# Patient Record
Sex: FEMALE | Race: WHITE | Employment: FULL TIME | URBAN - METROPOLITAN AREA
[De-identification: names, ages, dates, MRNs, and addresses within clinical notes are randomized per-mention and may not be internally consistent; named-entity substitution may affect disease eponyms.]

---

## 2019-01-11 ENCOUNTER — OFFICE VISIT (OUTPATIENT)
Dept: PRIMARY CARE CLINIC | Age: 64
End: 2019-01-11
Payer: COMMERCIAL

## 2019-01-11 VITALS
OXYGEN SATURATION: 98 % | HEIGHT: 68 IN | BODY MASS INDEX: 20.76 KG/M2 | SYSTOLIC BLOOD PRESSURE: 122 MMHG | HEART RATE: 74 BPM | DIASTOLIC BLOOD PRESSURE: 80 MMHG | TEMPERATURE: 97.8 F | WEIGHT: 137 LBS

## 2019-01-11 DIAGNOSIS — N39.0 FREQUENT UTI: Primary | ICD-10-CM

## 2019-01-11 DIAGNOSIS — H83.09 LABYRINTHITIS, UNSPECIFIED LATERALITY: ICD-10-CM

## 2019-01-11 DIAGNOSIS — E78.00 HYPERCHOLESTEROLEMIA: ICD-10-CM

## 2019-01-11 DIAGNOSIS — D69.6 THROMBOCYTOPENIA (HCC): Chronic | ICD-10-CM

## 2019-01-11 PROCEDURE — 99203 OFFICE O/P NEW LOW 30 MIN: CPT | Performed by: INTERNAL MEDICINE

## 2019-01-11 RX ORDER — MECLIZINE HCL 12.5 MG/1
TABLET ORAL
COMMUNITY
Start: 2018-04-19 | End: 2019-01-11 | Stop reason: SDUPTHER

## 2019-01-11 RX ORDER — TOBRAMYCIN AND DEXAMETHASONE 3; 1 MG/ML; MG/ML
SUSPENSION/ DROPS OPHTHALMIC
Refills: 0 | COMMUNITY
Start: 2018-12-28

## 2019-01-11 RX ORDER — SULFAMETHOXAZOLE AND TRIMETHOPRIM 800; 160 MG/1; MG/1
TABLET ORAL
Qty: 30 TABLET | Refills: 0 | Status: SHIPPED
Start: 2019-01-11 | End: 2021-04-01 | Stop reason: SDUPTHER

## 2019-01-11 RX ORDER — MECLIZINE HCL 12.5 MG/1
TABLET ORAL
Qty: 30 TABLET | Refills: 0 | Status: SHIPPED
Start: 2019-01-11 | End: 2021-04-01 | Stop reason: SDUPTHER

## 2019-01-11 RX ORDER — SULFAMETHOXAZOLE AND TRIMETHOPRIM 800; 160 MG/1; MG/1
TABLET ORAL
COMMUNITY
Start: 2018-04-19 | End: 2019-01-11 | Stop reason: SDUPTHER

## 2019-01-11 ASSESSMENT — PATIENT HEALTH QUESTIONNAIRE - PHQ9
SUM OF ALL RESPONSES TO PHQ QUESTIONS 1-9: 0
SUM OF ALL RESPONSES TO PHQ QUESTIONS 1-9: 0
2. FEELING DOWN, DEPRESSED OR HOPELESS: 0
1. LITTLE INTEREST OR PLEASURE IN DOING THINGS: 0
SUM OF ALL RESPONSES TO PHQ9 QUESTIONS 1 & 2: 0

## 2019-01-14 ENCOUNTER — NURSE ONLY (OUTPATIENT)
Dept: PRIMARY CARE CLINIC | Age: 64
End: 2019-01-14

## 2019-01-14 ENCOUNTER — HOSPITAL ENCOUNTER (OUTPATIENT)
Age: 64
Discharge: HOME | End: 2019-01-16
Payer: COMMERCIAL

## 2019-01-14 VITALS — HEIGHT: 68 IN | BODY MASS INDEX: 20.99 KG/M2 | WEIGHT: 138.5 LBS

## 2019-01-14 DIAGNOSIS — E78.2 MIXED HYPERLIPIDEMIA: Primary | ICD-10-CM

## 2019-01-14 DIAGNOSIS — D69.6 THROMBOCYTOPENIA (HCC): Chronic | ICD-10-CM

## 2019-01-14 DIAGNOSIS — E78.00 HYPERCHOLESTEROLEMIA: ICD-10-CM

## 2019-01-14 LAB
ALBUMIN SERPL-MCNC: 4.6 G/DL (ref 3.5–5.2)
ALP BLD-CCNC: 66 U/L (ref 35–104)
ALT SERPL-CCNC: 16 U/L (ref 0–32)
ANION GAP SERPL CALCULATED.3IONS-SCNC: 16 MMOL/L (ref 7–16)
AST SERPL-CCNC: 21 U/L (ref 0–31)
BILIRUB SERPL-MCNC: 0.2 MG/DL (ref 0–1.2)
BUN BLDV-MCNC: 15 MG/DL (ref 8–23)
CALCIUM SERPL-MCNC: 9.2 MG/DL (ref 8.6–10.2)
CHLORIDE BLD-SCNC: 102 MMOL/L (ref 98–107)
CHOLESTEROL, TOTAL: 190 MG/DL (ref 0–199)
CO2: 24 MMOL/L (ref 22–29)
CREAT SERPL-MCNC: 1 MG/DL (ref 0.5–1)
GFR AFRICAN AMERICAN: >60
GFR NON-AFRICAN AMERICAN: 56 ML/MIN/1.73
GLUCOSE BLD-MCNC: 83 MG/DL (ref 74–99)
HCT VFR BLD CALC: 41.8 % (ref 34–48)
HDLC SERPL-MCNC: 56 MG/DL
HEMOGLOBIN: 13.5 G/DL (ref 11.5–15.5)
LDL CHOLESTEROL CALCULATED: 104 MG/DL (ref 0–99)
MCH RBC QN AUTO: 30.6 PG (ref 26–35)
MCHC RBC AUTO-ENTMCNC: 32.3 % (ref 32–34.5)
MCV RBC AUTO: 94.8 FL (ref 80–99.9)
PDW BLD-RTO: 12.1 FL (ref 11.5–15)
PLATELET # BLD: 109 E9/L (ref 130–450)
PMV BLD AUTO: 10.2 FL (ref 7–12)
POTASSIUM SERPL-SCNC: 4.3 MMOL/L (ref 3.5–5)
RBC # BLD: 4.41 E12/L (ref 3.5–5.5)
SODIUM BLD-SCNC: 142 MMOL/L (ref 132–146)
TOTAL PROTEIN: 7 G/DL (ref 6.4–8.3)
TRIGL SERPL-MCNC: 152 MG/DL (ref 0–149)
VLDLC SERPL CALC-MCNC: 30 MG/DL
WBC # BLD: 4.8 E9/L (ref 4.5–11.5)

## 2019-01-14 PROCEDURE — 85027 COMPLETE CBC AUTOMATED: CPT

## 2019-01-14 PROCEDURE — 80053 COMPREHEN METABOLIC PANEL: CPT

## 2019-01-14 PROCEDURE — 80061 LIPID PANEL: CPT

## 2019-07-03 ENCOUNTER — OFFICE VISIT (OUTPATIENT)
Dept: PRIMARY CARE CLINIC | Age: 64
End: 2019-07-03
Payer: COMMERCIAL

## 2019-07-03 VITALS
HEART RATE: 74 BPM | DIASTOLIC BLOOD PRESSURE: 80 MMHG | OXYGEN SATURATION: 98 % | BODY MASS INDEX: 20.83 KG/M2 | TEMPERATURE: 97.8 F | SYSTOLIC BLOOD PRESSURE: 138 MMHG | WEIGHT: 137 LBS

## 2019-07-03 DIAGNOSIS — L08.9 SKIN INFECTION: Primary | ICD-10-CM

## 2019-07-03 PROCEDURE — 99213 OFFICE O/P EST LOW 20 MIN: CPT | Performed by: INTERNAL MEDICINE

## 2019-10-09 ENCOUNTER — OFFICE VISIT (OUTPATIENT)
Dept: PRIMARY CARE CLINIC | Age: 64
End: 2019-10-09
Payer: MEDICARE

## 2019-10-09 VITALS
BODY MASS INDEX: 21.13 KG/M2 | SYSTOLIC BLOOD PRESSURE: 122 MMHG | OXYGEN SATURATION: 99 % | HEART RATE: 74 BPM | TEMPERATURE: 96 F | DIASTOLIC BLOOD PRESSURE: 68 MMHG | WEIGHT: 139 LBS

## 2019-10-09 DIAGNOSIS — J40 BRONCHITIS: Primary | ICD-10-CM

## 2019-10-09 PROCEDURE — 1036F TOBACCO NON-USER: CPT | Performed by: INTERNAL MEDICINE

## 2019-10-09 PROCEDURE — 99213 OFFICE O/P EST LOW 20 MIN: CPT | Performed by: INTERNAL MEDICINE

## 2019-10-09 PROCEDURE — 3014F SCREEN MAMMO DOC REV: CPT | Performed by: INTERNAL MEDICINE

## 2019-10-09 PROCEDURE — G8427 DOCREV CUR MEDS BY ELIG CLIN: HCPCS | Performed by: INTERNAL MEDICINE

## 2019-10-09 PROCEDURE — 4040F PNEUMOC VAC/ADMIN/RCVD: CPT | Performed by: INTERNAL MEDICINE

## 2019-10-09 PROCEDURE — 3017F COLORECTAL CA SCREEN DOC REV: CPT | Performed by: INTERNAL MEDICINE

## 2019-10-09 PROCEDURE — 1123F ACP DISCUSS/DSCN MKR DOCD: CPT | Performed by: INTERNAL MEDICINE

## 2019-10-09 PROCEDURE — 1090F PRES/ABSN URINE INCON ASSESS: CPT | Performed by: INTERNAL MEDICINE

## 2019-10-09 PROCEDURE — G8400 PT W/DXA NO RESULTS DOC: HCPCS | Performed by: INTERNAL MEDICINE

## 2019-10-09 PROCEDURE — G8484 FLU IMMUNIZE NO ADMIN: HCPCS | Performed by: INTERNAL MEDICINE

## 2019-10-09 PROCEDURE — G8420 CALC BMI NORM PARAMETERS: HCPCS | Performed by: INTERNAL MEDICINE

## 2019-10-09 RX ORDER — BENZONATATE 100 MG/1
100 CAPSULE ORAL 2 TIMES DAILY PRN
Qty: 20 CAPSULE | Refills: 0 | Status: SHIPPED | OUTPATIENT
Start: 2019-10-09 | End: 2019-10-16

## 2019-10-09 RX ORDER — AZITHROMYCIN 250 MG/1
250 TABLET, FILM COATED ORAL SEE ADMIN INSTRUCTIONS
Qty: 6 TABLET | Refills: 0 | Status: SHIPPED | OUTPATIENT
Start: 2019-10-09 | End: 2019-10-14

## 2019-10-09 RX ORDER — TOLTERODINE 4 MG/1
CAPSULE, EXTENDED RELEASE ORAL
Refills: 0 | COMMUNITY
Start: 2019-09-26

## 2020-06-11 ENCOUNTER — VIRTUAL VISIT (OUTPATIENT)
Dept: PRIMARY CARE CLINIC | Age: 65
End: 2020-06-11
Payer: MEDICARE

## 2020-06-11 PROCEDURE — 3017F COLORECTAL CA SCREEN DOC REV: CPT | Performed by: INTERNAL MEDICINE

## 2020-06-11 PROCEDURE — G8427 DOCREV CUR MEDS BY ELIG CLIN: HCPCS | Performed by: INTERNAL MEDICINE

## 2020-06-11 PROCEDURE — 4040F PNEUMOC VAC/ADMIN/RCVD: CPT | Performed by: INTERNAL MEDICINE

## 2020-06-11 PROCEDURE — G8420 CALC BMI NORM PARAMETERS: HCPCS | Performed by: INTERNAL MEDICINE

## 2020-06-11 PROCEDURE — 1090F PRES/ABSN URINE INCON ASSESS: CPT | Performed by: INTERNAL MEDICINE

## 2020-06-11 PROCEDURE — G8400 PT W/DXA NO RESULTS DOC: HCPCS | Performed by: INTERNAL MEDICINE

## 2020-06-11 PROCEDURE — 1036F TOBACCO NON-USER: CPT | Performed by: INTERNAL MEDICINE

## 2020-06-11 PROCEDURE — 99213 OFFICE O/P EST LOW 20 MIN: CPT | Performed by: INTERNAL MEDICINE

## 2020-06-11 PROCEDURE — 1123F ACP DISCUSS/DSCN MKR DOCD: CPT | Performed by: INTERNAL MEDICINE

## 2020-06-11 RX ORDER — FLUTICASONE FUROATE 100 UG/1
1 POWDER RESPIRATORY (INHALATION) DAILY
Qty: 30 EACH | Refills: 0 | Status: SHIPPED
Start: 2020-06-11 | End: 2021-04-01

## 2020-06-11 ASSESSMENT — PATIENT HEALTH QUESTIONNAIRE - PHQ9
SUM OF ALL RESPONSES TO PHQ9 QUESTIONS 1 & 2: 0
1. LITTLE INTEREST OR PLEASURE IN DOING THINGS: 0
SUM OF ALL RESPONSES TO PHQ QUESTIONS 1-9: 0
SUM OF ALL RESPONSES TO PHQ QUESTIONS 1-9: 0
2. FEELING DOWN, DEPRESSED OR HOPELESS: 0

## 2020-06-11 NOTE — PROGRESS NOTES
for activity change, appetite change, chills, fatigue and fever. Respiratory: chronic cough   Cardiovascular: Negative for chest pain, palpitations and leg swelling. Gastrointestinal: Negative for abdominal distention, constipation, diarrhea, nausea and vomiting. Musculoskeletal: Negative for arthralgias, back pain, gait problem and joint swelling. Neurological: Negative for dizziness, weakness,numbness and headaches. There were no vitals taken for this visit. Physical Exam   Constitutional:  Oriented to person, place, and time. Appears well-developed and well-nourished. No acute distress. Neurological:Alert and oriented to person, place, and time. Skin: No diaphoresis. Psychiatric: Normal mood and affect. Behavior is Normal.     ASSESSMENT AND PLAN:    Naeem Jean was seen today for cough. Diagnoses and all orders for this visit:    Chronic cough  -     beclomethasone (QVAR) 80 MCG/ACT inhaler; Inhale 1 puff into the lungs 2 times daily  -     XR CHEST STANDARD (2 VW); Future        Thalia Blaine presents today for evaluation of a persistent cough since March. Her symptoms started with a URI illness. She denies any fevers chills weight loss or productivity of the cough. I suspect that she likely has post infectious bronchospasm. I will treat her with Qvar. I will also perform radiography of the chest    TeleMedicine Patient Consent    This visit was performed as a virtual video visit using a synchronous, two-way, audio-video telehealth technology platform. Patient identification was verified at the start of the visit, including the patient's telephone number and physical location. I discussed with the patient the nature of our telehealth visits, that:     1. Due to the nature of an audio- video modality, the only components of a physical exam that could be done are the elements supported by direct observation.   2. I would evaluate the patient and recommend diagnostics and treatments based on

## 2020-06-12 ENCOUNTER — HOSPITAL ENCOUNTER (OUTPATIENT)
Dept: GENERAL RADIOLOGY | Age: 65
Discharge: HOME | End: 2020-06-14
Payer: MEDICARE

## 2020-06-12 ENCOUNTER — HOSPITAL ENCOUNTER (OUTPATIENT)
Age: 65
Discharge: HOME | End: 2020-06-14
Payer: MEDICARE

## 2020-06-12 PROCEDURE — 71046 X-RAY EXAM CHEST 2 VIEWS: CPT

## 2020-07-16 ENCOUNTER — VIRTUAL VISIT (OUTPATIENT)
Dept: PRIMARY CARE CLINIC | Age: 65
End: 2020-07-16
Payer: MEDICARE

## 2020-07-16 PROCEDURE — 99213 OFFICE O/P EST LOW 20 MIN: CPT | Performed by: INTERNAL MEDICINE

## 2020-07-16 PROCEDURE — 1090F PRES/ABSN URINE INCON ASSESS: CPT | Performed by: INTERNAL MEDICINE

## 2020-07-16 PROCEDURE — 1123F ACP DISCUSS/DSCN MKR DOCD: CPT | Performed by: INTERNAL MEDICINE

## 2020-07-16 PROCEDURE — G8427 DOCREV CUR MEDS BY ELIG CLIN: HCPCS | Performed by: INTERNAL MEDICINE

## 2020-07-16 PROCEDURE — 1036F TOBACCO NON-USER: CPT | Performed by: INTERNAL MEDICINE

## 2020-07-16 PROCEDURE — G8420 CALC BMI NORM PARAMETERS: HCPCS | Performed by: INTERNAL MEDICINE

## 2020-07-16 PROCEDURE — 3017F COLORECTAL CA SCREEN DOC REV: CPT | Performed by: INTERNAL MEDICINE

## 2020-07-16 PROCEDURE — 4040F PNEUMOC VAC/ADMIN/RCVD: CPT | Performed by: INTERNAL MEDICINE

## 2020-07-16 PROCEDURE — G8400 PT W/DXA NO RESULTS DOC: HCPCS | Performed by: INTERNAL MEDICINE

## 2020-07-16 RX ORDER — METHYLPREDNISOLONE 4 MG/1
TABLET ORAL
Qty: 1 KIT | Refills: 0 | Status: SHIPPED | OUTPATIENT
Start: 2020-07-16 | End: 2020-07-22

## 2020-07-16 RX ORDER — DOXYCYCLINE HYCLATE 100 MG/1
100 CAPSULE ORAL 2 TIMES DAILY
Qty: 20 CAPSULE | Refills: 0 | Status: SHIPPED | OUTPATIENT
Start: 2020-07-16 | End: 2020-07-26

## 2020-07-16 NOTE — PROGRESS NOTES
7/16/20    David Caldwell, a female of 71 y.o. came to the office     David Caldwell presents today for evaluation of headache and nausea. This has been going on for 2 days. She thought she had food poisoning. She had diarrhea. She woke up Tuesday with a bad headache. The pain is over the left eye. She was treated recently with Qvar for a chronic cough. Her sinuses have been bothering her. She has been taking sinus pill that has been helping. She did get a rash as well. She has not had any vision changes. She denies any vision changes. She did not have any falls or injuries to the head. She does have some sinus congestion but denies any fevers or chills. She did have one episode yesterday of vomiting but felt that this was likely due to her pain    Patient Active Problem List   Diagnosis    Endometrial polyp    Thrombocytopenia (HCC)      Allergies   Allergen Reactions    Penicillins Rash    Aspirin Other (See Comments)     Ears ring     Current Outpatient Medications on File Prior to Visit   Medication Sig Dispense Refill    beclomethasone (QVAR) 80 MCG/ACT inhaler Inhale 1 puff into the lungs 2 times daily 1 Inhaler 3    fluticasone (ARNUITY ELLIPTA) 100 MCG/ACT AEPB Inhale 1 puff into the lungs daily 30 each 0    tolterodine (DETROL LA) 4 MG extended release capsule take 1 capsule by mouth daily if needed  0    tobramycin-dexamethasone (TOBRADEX) 0.3-0.1 % ophthalmic suspension apply 2 drops INTO AFFECTED EYE twice a day for 7 days  0    sulfamethoxazole-trimethoprim (BACTRIM DS;SEPTRA DS) 800-160 MG per tablet take 1 tablet by mouth AFTER INTERCOURSE TO PREVENT INFECTION 30 tablet 0    meclizine (ANTIVERT) 12.5 MG tablet take 1 tablet by mouth three times a day 30 tablet 0    ibuprofen (IBU) 600 MG tablet Take 1 tablet by mouth every 6 hours as needed for Pain. 20 tablet 0    Multiple Vitamin (MULTIVITAMIN PO) Take  by mouth daily.         Omega-3 Fatty Acids (OMEGA 3 PO) Take 2 tablets by mouth daily.        Misc Natural Products (OSTEO BI-FLEX TRIPLE STRENGTH PO) Take 2 tablets by mouth daily.  Calcium Carbonate-Vitamin D (CALCIUM 600/VITAMIN D PO) Take  by mouth. 800 mg vit d       GREEN TEA 2 drops by Does not apply route daily. No current facility-administered medications on file prior to visit. Review of Systems  Constitutional:Negative for activity change, appetite change, chills, fatigue and fever. Respiratory: Negative for choking, chest tightness, shortness of breath and wheezing. Cardiovascular: Negative for chest pain, palpitations and leg swelling. Gastrointestinal: Negative for abdominal distention, constipation, diarrhea, nausea and vomiting. Musculoskeletal: Negative for arthralgias, back pain, gait problem and joint swelling. Neurological: Negative for dizziness, weakness,numbness and headaches. There were no vitals taken for this visit. Physical Exam   Constitutional:  Oriented to person, place, and time. Appears well-developed and well-nourished. No acute distress. HENT: No sinus tenderness or lymphadenopathy  Head: Normocephalic and atraumatic. Eyes: Eyes exhibits no discharge. No scleral icterus present. Neck: No tracheal deviation present. No thyromegaly present. Cardiovascular: Normal rate, regular rhythm, normal heart sounds and intact distal pulses. Exam reveals no gallop nor friction rub. No murmur heard. Pulmonary: Effort normal and breath sounds normal. No respiratory distress. No wheezes or rales. Musculoskeletal:  No tenderness to palpation  Neurological:Alert and oriented to person, place, and time. Skin: No diaphoresis. Psychiatric: Normal mood and affect. Behavior is Normal.     ASSESSMENT AND PLAN:    Ro Casas was seen today for headache and nausea. Diagnoses and all orders for this visit:    Sinus congestion  -     doxycycline hyclate (VIBRAMYCIN) 100 MG capsule;  Take 1 capsule by mouth 2 times daily for 10 days  -     methylPREDNISolone (MEDROL DOSEPACK) 4 MG tablet; Take by mouth. Madelyn Boast presents today for dilation of severe left-sided headache accompanied with nausea. She states that when she uses Sudafed this alleviates her symptoms but unfortunately they returned. Today I will prescribe her Medrol and doxycycline due to her penicillin allergy. She does state that this is the most severe headache she has had in quite some time. If her symptoms fail to improve or worsen she may benefit from CT scan of the head. She denies any neurological signs or symptoms today other than headache. Please note that the above documentation was prepared using voice recognition software. Every attempt was made to ensure accuracy but there may be spelling, grammatical, and contextual errors. Electronically signed by Adrianne Murray DO on 7/16/2020 at 1:51 PM        No follow-ups on file.     Adrianne Murray DO

## 2020-09-22 ENCOUNTER — HOSPITAL ENCOUNTER (OUTPATIENT)
Age: 65
Discharge: HOME | End: 2020-09-24
Payer: MEDICARE

## 2020-09-22 PROCEDURE — G0123 SCREEN CERV/VAG THIN LAYER: HCPCS

## 2021-04-01 ENCOUNTER — OFFICE VISIT (OUTPATIENT)
Dept: PRIMARY CARE CLINIC | Age: 66
End: 2021-04-01
Payer: MEDICARE

## 2021-04-01 VITALS
WEIGHT: 135 LBS | OXYGEN SATURATION: 96 % | HEIGHT: 68 IN | HEART RATE: 75 BPM | TEMPERATURE: 97.5 F | SYSTOLIC BLOOD PRESSURE: 128 MMHG | DIASTOLIC BLOOD PRESSURE: 80 MMHG | BODY MASS INDEX: 20.46 KG/M2

## 2021-04-01 DIAGNOSIS — N39.0 FREQUENT UTI: ICD-10-CM

## 2021-04-01 DIAGNOSIS — H83.09 LABYRINTHITIS, UNSPECIFIED LATERALITY: ICD-10-CM

## 2021-04-01 DIAGNOSIS — R68.84 JAW PAIN, NON-TMJ: Primary | ICD-10-CM

## 2021-04-01 PROCEDURE — 4040F PNEUMOC VAC/ADMIN/RCVD: CPT | Performed by: INTERNAL MEDICINE

## 2021-04-01 PROCEDURE — G8427 DOCREV CUR MEDS BY ELIG CLIN: HCPCS | Performed by: INTERNAL MEDICINE

## 2021-04-01 PROCEDURE — 1123F ACP DISCUSS/DSCN MKR DOCD: CPT | Performed by: INTERNAL MEDICINE

## 2021-04-01 PROCEDURE — G8400 PT W/DXA NO RESULTS DOC: HCPCS | Performed by: INTERNAL MEDICINE

## 2021-04-01 PROCEDURE — 3017F COLORECTAL CA SCREEN DOC REV: CPT | Performed by: INTERNAL MEDICINE

## 2021-04-01 PROCEDURE — 1090F PRES/ABSN URINE INCON ASSESS: CPT | Performed by: INTERNAL MEDICINE

## 2021-04-01 PROCEDURE — 99213 OFFICE O/P EST LOW 20 MIN: CPT | Performed by: INTERNAL MEDICINE

## 2021-04-01 PROCEDURE — 1036F TOBACCO NON-USER: CPT | Performed by: INTERNAL MEDICINE

## 2021-04-01 PROCEDURE — G8420 CALC BMI NORM PARAMETERS: HCPCS | Performed by: INTERNAL MEDICINE

## 2021-04-01 RX ORDER — SULFAMETHOXAZOLE AND TRIMETHOPRIM 800; 160 MG/1; MG/1
TABLET ORAL
Qty: 30 TABLET | Refills: 0 | Status: SHIPPED
Start: 2021-04-01 | End: 2022-10-26 | Stop reason: SDUPTHER

## 2021-04-01 RX ORDER — MECLIZINE HCL 12.5 MG/1
TABLET ORAL
Qty: 30 TABLET | Refills: 0 | Status: SHIPPED
Start: 2021-04-01 | End: 2022-10-26 | Stop reason: SDUPTHER

## 2021-04-01 RX ORDER — NAPROXEN 500 MG/1
500 TABLET ORAL 2 TIMES DAILY WITH MEALS
Qty: 14 TABLET | Refills: 0 | Status: SHIPPED | OUTPATIENT
Start: 2021-04-01 | End: 2021-04-08

## 2021-04-01 ASSESSMENT — PATIENT HEALTH QUESTIONNAIRE - PHQ9
SUM OF ALL RESPONSES TO PHQ QUESTIONS 1-9: 0
SUM OF ALL RESPONSES TO PHQ QUESTIONS 1-9: 0
2. FEELING DOWN, DEPRESSED OR HOPELESS: 0

## 2021-04-01 NOTE — PROGRESS NOTES
4/1/21    Tiffany Beck, a female of 76 y.o. came to the office     Tiffany Beck presents today for evaluation of ear pain. She fell last Wednesday and struck her chin. Her left ear started to hurt afterwards. She followed up with her eye doctor and chiropractor. She tripped on the sidewalk at that time. She has some pain on the inside of the jaw. She denies any neck pain. Patient Active Problem List   Diagnosis    Endometrial polyp    Thrombocytopenia (HCC)      Allergies   Allergen Reactions    Penicillins Rash    Aspirin Other (See Comments)     Ears ring     Current Outpatient Medications on File Prior to Visit   Medication Sig Dispense Refill    fluticasone (VERAMYST) 27.5 MCG/SPRAY nasal spray 2 sprays by Each Nostril route daily      tolterodine (DETROL LA) 4 MG extended release capsule take 1 capsule by mouth daily if needed  0    tobramycin-dexamethasone (TOBRADEX) 0.3-0.1 % ophthalmic suspension apply 2 drops INTO AFFECTED EYE twice a day for 7 days  0    ibuprofen (IBU) 600 MG tablet Take 1 tablet by mouth every 6 hours as needed for Pain. 20 tablet 0    Multiple Vitamin (MULTIVITAMIN PO) Take  by mouth daily.  Omega-3 Fatty Acids (OMEGA 3 PO) Take 2 tablets by mouth daily.  Misc Natural Products (OSTEO BI-FLEX TRIPLE STRENGTH PO) Take 2 tablets by mouth daily.  Calcium Carbonate-Vitamin D (CALCIUM 600/VITAMIN D PO) Take  by mouth. 800 mg vit d        No current facility-administered medications on file prior to visit. Review of Systems  Constitutional:Negative for activity change, appetite change, chills, fatigue and fever. Respiratory: Negative for choking, chest tightness, shortness of breath and wheezing. Cardiovascular: Negative for chest pain, palpitations and leg swelling. Gastrointestinal: Negative for abdominal distention, constipation, diarrhea, nausea and vomiting.    Musculoskeletal: Negative for arthralgias, back pain, gait problem and joint swelling. Neurological: Negative for dizziness, weakness,numbness and headaches. /80   Pulse 75   Temp 97.5 °F (36.4 °C)   Ht 5' 8\" (1.727 m)   Wt 135 lb (61.2 kg)   SpO2 96%   BMI 20.53 kg/m²      Physical Exam   Constitutional:  Oriented to person, place, and time. Appears well-developed and well-nourished. No acute distress. HENT: No sinus tenderness or lymphadenopathy  Head: Normocephalic and atraumatic. Eyes: Eyes exhibits no discharge. No scleral icterus present. Neck: No tracheal deviation present. No thyromegaly present. Cardiovascular: Normal rate, regular rhythm, normal heart sounds and intact distal pulses. Exam reveals no gallop nor friction rub. No murmur heard. Pulmonary: Effort normal and breath sounds normal. No respiratory distress. No wheezes or rales. Abdomen: No signs of rigidity rebound or organomegaly  Musculoskeletal:  No tenderness to palpation  Neurological:Alert and oriented to person, place, and time. Skin: No diaphoresis. Psychiatric: Normal mood and affect. Behavior is Normal.     ASSESSMENT AND PLAN:    Curry Shirley was seen today for otalgia and head injury. Diagnoses and all orders for this visit:    Jaw pain, non-TMJ  -     naproxen (NAPROSYN) 500 MG tablet;  Take 1 tablet by mouth 2 times daily (with meals) for 14 doses    Frequent UTI  -     sulfamethoxazole-trimethoprim (BACTRIM DS;SEPTRA DS) 800-160 MG per tablet; take 1 tablet by mouth AFTER INTERCOURSE TO PREVENT INFECTION    Labyrinthitis, unspecified laterality  -     meclizine (ANTIVERT) 12.5 MG tablet; take 1 tablet by mouth three times a day        She presents today for left TMJ pain after a fall    Her external auditory canal is unremarkable    I will start her on Naprosyn    She should continue Bactrim for her frequent urinary tract infections and Antivert for her vertigo as needed    I have low clinical suspicion for concussion    Return if symptoms worsen or fail to improve.     Electronically signed by Enedina Rowley DO on 4/1/2021 at 11:35 AM    Enedina Rowley DO

## 2021-04-23 ENCOUNTER — OFFICE VISIT (OUTPATIENT)
Dept: FAMILY MEDICINE CLINIC | Age: 66
End: 2021-04-23
Payer: MEDICARE

## 2021-04-23 VITALS
TEMPERATURE: 97 F | RESPIRATION RATE: 18 BRPM | HEART RATE: 114 BPM | HEIGHT: 68 IN | WEIGHT: 132 LBS | DIASTOLIC BLOOD PRESSURE: 78 MMHG | SYSTOLIC BLOOD PRESSURE: 118 MMHG | BODY MASS INDEX: 20 KG/M2 | OXYGEN SATURATION: 98 %

## 2021-04-23 DIAGNOSIS — U07.1 COVID-19: Primary | ICD-10-CM

## 2021-04-23 LAB
Lab: ABNORMAL
PERFORMING INSTRUMENT: ABNORMAL
QC PASS/FAIL: ABNORMAL
SARS-COV-2, POC: DETECTED

## 2021-04-23 PROCEDURE — 4040F PNEUMOC VAC/ADMIN/RCVD: CPT | Performed by: NURSE PRACTITIONER

## 2021-04-23 PROCEDURE — G8400 PT W/DXA NO RESULTS DOC: HCPCS | Performed by: NURSE PRACTITIONER

## 2021-04-23 PROCEDURE — 3017F COLORECTAL CA SCREEN DOC REV: CPT | Performed by: NURSE PRACTITIONER

## 2021-04-23 PROCEDURE — G8420 CALC BMI NORM PARAMETERS: HCPCS | Performed by: NURSE PRACTITIONER

## 2021-04-23 PROCEDURE — 99213 OFFICE O/P EST LOW 20 MIN: CPT | Performed by: NURSE PRACTITIONER

## 2021-04-23 PROCEDURE — G8427 DOCREV CUR MEDS BY ELIG CLIN: HCPCS | Performed by: NURSE PRACTITIONER

## 2021-04-23 PROCEDURE — 87426 SARSCOV CORONAVIRUS AG IA: CPT | Performed by: NURSE PRACTITIONER

## 2021-04-23 PROCEDURE — 1090F PRES/ABSN URINE INCON ASSESS: CPT | Performed by: NURSE PRACTITIONER

## 2021-04-23 PROCEDURE — 1036F TOBACCO NON-USER: CPT | Performed by: NURSE PRACTITIONER

## 2021-04-23 PROCEDURE — 1123F ACP DISCUSS/DSCN MKR DOCD: CPT | Performed by: NURSE PRACTITIONER

## 2021-04-23 RX ORDER — DOXYCYCLINE HYCLATE 100 MG/1
100 CAPSULE ORAL 2 TIMES DAILY
Qty: 20 CAPSULE | Refills: 0 | Status: SHIPPED | OUTPATIENT
Start: 2021-04-23 | End: 2021-05-03

## 2021-04-23 RX ORDER — ZINC SULFATE 50(220)MG
50 CAPSULE ORAL DAILY
Qty: 7 CAPSULE | Refills: 0 | Status: SHIPPED | OUTPATIENT
Start: 2021-04-23 | End: 2022-10-26

## 2021-04-23 NOTE — PROGRESS NOTES
21  Sarah Miller : 1955 Sex: female  Age 76 y.o. Subjective:  Chief Complaint   Patient presents with    Generalized Body Aches     since wed (exposure to Covid / ) he was seen This past Monday.  Fatigue    Diarrhea       HPI:   Sarah Miller , 76 y.o. female presents to the clinic for evaluation of sinus congestion x 2 day. The patient also reports body aches, headache, fatigue, mild intermittent dry cough, and intermittent diarrhea. The patient has taken Tylenol for symptoms. The patient reports unchanged symptoms over time. The patient reports COVID-19 ( / father) ill exposure. The patient denies acute loss of taste and smell, headache, sore throat, rash, and fever. The patient also denies acute: chest pain, abdominal pain, shortness of breath, and nausea / vomiting. ROS:   Unless otherwise stated in this report the patient's positive and negative responses for review of systems for constitutional, eyes, ENT, cardiovascular, respiratory, gastrointestinal, neurological, , musculoskeletal, and integument systems and related systems to the presenting problem are either stated in the history of present illness or were not pertinent or were negative for the symptoms and/or complaints related to the presenting medical problem. Positives and pertinent negatives as per HPI. All others reviewed and are negative. PMH:     Past Medical History:   Diagnosis Date    Nausea & vomiting        Past Surgical History:   Procedure Laterality Date    CERVIX SURGERY      abn pap    COLONOSCOPY      DILATION AND CURETTAGE OF UTERUS      DILATION AND CURETTAGE OF UTERUS  [id number]       History reviewed. No pertinent family history.     Medications:     Current Outpatient Medications:     Ascorbic Acid (VITAMIN C) 500 MG CHEW, Take 1 tablet by mouth 2 times daily for 7 days, Disp: 14 tablet, Rfl: 0    zinc sulfate (ZINC-220) 220 (50 Zn) MG capsule, Take 1 capsule by mouth daily for 7 days, Disp: 7 capsule, Rfl: 0    doxycycline hyclate (VIBRAMYCIN) 100 MG capsule, Take 1 capsule by mouth 2 times daily for 10 days, Disp: 20 capsule, Rfl: 0    fluticasone (VERAMYST) 27.5 MCG/SPRAY nasal spray, 2 sprays by Each Nostril route daily, Disp: , Rfl:     sulfamethoxazole-trimethoprim (BACTRIM DS;SEPTRA DS) 800-160 MG per tablet, take 1 tablet by mouth AFTER INTERCOURSE TO PREVENT INFECTION, Disp: 30 tablet, Rfl: 0    meclizine (ANTIVERT) 12.5 MG tablet, take 1 tablet by mouth three times a day, Disp: 30 tablet, Rfl: 0    tolterodine (DETROL LA) 4 MG extended release capsule, take 1 capsule by mouth daily if needed, Disp: , Rfl: 0    tobramycin-dexamethasone (TOBRADEX) 0.3-0.1 % ophthalmic suspension, apply 2 drops INTO AFFECTED EYE twice a day for 7 days, Disp: , Rfl: 0    ibuprofen (IBU) 600 MG tablet, Take 1 tablet by mouth every 6 hours as needed for Pain., Disp: 20 tablet, Rfl: 0    Multiple Vitamin (MULTIVITAMIN PO), Take  by mouth daily. , Disp: , Rfl:     Omega-3 Fatty Acids (OMEGA 3 PO), Take 2 tablets by mouth daily. , Disp: , Rfl:     Misc Natural Products (OSTEO BI-FLEX TRIPLE STRENGTH PO), Take 2 tablets by mouth daily. , Disp: , Rfl:     Calcium Carbonate-Vitamin D (CALCIUM 600/VITAMIN D PO), Take  by mouth. 800 mg vit d , Disp: , Rfl:     naproxen (NAPROSYN) 500 MG tablet, Take 1 tablet by mouth 2 times daily (with meals) for 14 doses, Disp: 14 tablet, Rfl: 0    Allergies: Allergies   Allergen Reactions    Penicillins Rash    Aspirin Other (See Comments)     Ears ring       Social History:     Social History     Tobacco Use    Smoking status: Never Smoker    Smokeless tobacco: Never Used   Substance Use Topics    Alcohol use: No    Drug use: No       Patient lives at home.     Physical Exam:     Vitals:    04/23/21 1047   BP: 118/78   Pulse: 114   Resp: 18   Temp: 97 °F (36.1 °C)   TempSrc: Temporal   SpO2: 98%   Weight: 132 lb (59.9 kg)   Height: 5' 8\" (1.727 m)       Physical Exam (PE)    Physical Exam  Constitutional:       Appearance: Normal appearance. HENT:      Head: Normocephalic. Right Ear: Tympanic membrane, ear canal and external ear normal.      Left Ear: Tympanic membrane, ear canal and external ear normal.      Nose: Congestion and rhinorrhea present. Mouth/Throat:      Mouth: Mucous membranes are moist.      Pharynx: Oropharynx is clear. Eyes:      Pupils: Pupils are equal, round, and reactive to light. Neck:      Musculoskeletal: Normal range of motion and neck supple. Cardiovascular:      Rate and Rhythm: Normal rate and regular rhythm. Pulses: Normal pulses. Heart sounds: Normal heart sounds. Pulmonary:      Effort: Pulmonary effort is normal.      Breath sounds: Normal breath sounds. No wheezing, rhonchi or rales. Abdominal:      General: Bowel sounds are normal.      Palpations: Abdomen is soft. Musculoskeletal: Normal range of motion. Lymphadenopathy:      Cervical: No cervical adenopathy. Skin:     General: Skin is warm and dry. Capillary Refill: Capillary refill takes less than 2 seconds. Neurological:      General: No focal deficit present. Mental Status: She is alert and oriented to person, place, and time. Psychiatric:         Mood and Affect: Mood normal.         Behavior: Behavior normal.          Testing:   (All laboratory and radiology results have been personally reviewed by myself)  Labs:  Results for orders placed or performed in visit on 04/23/21   POCT COVID-19, Antigen   Result Value Ref Range    SARS-COV-2, POC Detected (A) Not Detected    Lot Number [id number]     QC Pass/Fail PASS     Performing Instrument BD Veritor        Imaging: All Radiology results interpreted by Radiologist unless otherwise noted. No orders to display       Assessment / Plan:   The patient's vitals, allergies, medications, and past medical history have been reviewed.   Clay Duong was seen today for generalized body aches, fatigue and diarrhea. Diagnoses and all orders for this visit:    COVID-19  -     POCT COVID-19, Antigen  -     Ascorbic Acid (VITAMIN C) 500 MG CHEW; Take 1 tablet by mouth 2 times daily for 7 days  -     zinc sulfate (ZINC-220) 220 (50 Zn) MG capsule; Take 1 capsule by mouth daily for 7 days  -     doxycycline hyclate (VIBRAMYCIN) 100 MG capsule; Take 1 capsule by mouth 2 times daily for 10 days        - Disposition: Home    - Educational material printed for patient's review and were included in patient instructions. After Visit Summary and given to patient at the end of visit. - COVID-19 swab obtained and pending, will call with results once available. Advised cautionary self-quarantine at home per [facility] guidelines. Encouraged oral fluids and rest. Discussed symptomatic treatments with patient today including avoiding GI irritants, BRAT diet, Mucinex DM prn for cough/congestion, and Tylenol prn for fever / pain. Schedule a follow-up with PCP in 2-3 days. Red flag symptoms were discussed with the patient today. The patient is directed to go the ED if symptoms change or worsen. Pt verbalizes understanding and is in agreement with plan of care. All questions answered. SIGNATURE: IVAN Cruz    *NOTE: This report was transcribed using voice recognition software. Every effort was made to ensure accuracy; however, inadvertent computerized transcription errors may be present.

## 2021-04-23 NOTE — PATIENT INSTRUCTIONS

## 2021-04-23 NOTE — LETTER
[facility]  [address] [place] [address]  Phone: [phone]  Fax: [phone]    Marty Meigs, APRN - CNP        April 23, 2021       To Whom It May Concern: It is my medical opinion that Jackie Cordon should quarantine from work / school for positive COVID-19 test result. A positive COVID-19 test, needs a minimum of 10 day strict quarantine based on symptoms. The patient should quarantine through 5/1/21. The patient is required to have improving symptoms and 24 hours fever free without fever reducing medications to end quarantine. The patient does not require retesting to return to work/school. If you have any questions or concerns, please don't hesitate to call.         Sincerely,        Marty Meigs, APRN - CNP

## 2021-04-27 ENCOUNTER — VIRTUAL VISIT (OUTPATIENT)
Dept: PRIMARY CARE CLINIC | Age: 66
End: 2021-04-27
Payer: MEDICARE

## 2021-04-27 DIAGNOSIS — U07.1 COVID-19 VIRUS INFECTION: Primary | ICD-10-CM

## 2021-04-27 PROCEDURE — G8427 DOCREV CUR MEDS BY ELIG CLIN: HCPCS | Performed by: INTERNAL MEDICINE

## 2021-04-27 PROCEDURE — 3017F COLORECTAL CA SCREEN DOC REV: CPT | Performed by: INTERNAL MEDICINE

## 2021-04-27 PROCEDURE — 1036F TOBACCO NON-USER: CPT | Performed by: INTERNAL MEDICINE

## 2021-04-27 PROCEDURE — 4040F PNEUMOC VAC/ADMIN/RCVD: CPT | Performed by: INTERNAL MEDICINE

## 2021-04-27 PROCEDURE — G8420 CALC BMI NORM PARAMETERS: HCPCS | Performed by: INTERNAL MEDICINE

## 2021-04-27 PROCEDURE — 1090F PRES/ABSN URINE INCON ASSESS: CPT | Performed by: INTERNAL MEDICINE

## 2021-04-27 PROCEDURE — 1123F ACP DISCUSS/DSCN MKR DOCD: CPT | Performed by: INTERNAL MEDICINE

## 2021-04-27 PROCEDURE — 99213 OFFICE O/P EST LOW 20 MIN: CPT | Performed by: INTERNAL MEDICINE

## 2021-04-27 PROCEDURE — G8400 PT W/DXA NO RESULTS DOC: HCPCS | Performed by: INTERNAL MEDICINE

## 2021-04-27 RX ORDER — ONDANSETRON 4 MG/1
4 TABLET, FILM COATED ORAL 3 TIMES DAILY PRN
Qty: 15 TABLET | Refills: 0 | Status: SHIPPED | OUTPATIENT
Start: 2021-04-27

## 2021-04-27 NOTE — PROGRESS NOTES
4/27/21    Tiffany Beck, a female of 76 y.o. came to the office     Tiffany Beck presents today for virtual visit for evaluation of Covid. She was seen on April 23 and tested positive for Covid. She was feeling better but over the past 24 hours has had worsening fatigue body aches and vomiting. She felt well yesterday and felt that she overexerted herself. She has a fever of 100. She does have a minimal cough but denies any shortness of breath. She continues to take doxycycline vitamin C and zinc as instructed by urgent care. Patient Active Problem List   Diagnosis    Endometrial polyp    Thrombocytopenia (HCC)      Allergies   Allergen Reactions    Penicillins Rash    Aspirin Other (See Comments)     Ears ring     Current Outpatient Medications on File Prior to Visit   Medication Sig Dispense Refill    Ascorbic Acid (VITAMIN C) 500 MG CHEW Take 1 tablet by mouth 2 times daily for 7 days 14 tablet 0    zinc sulfate (ZINC-220) 220 (50 Zn) MG capsule Take 1 capsule by mouth daily for 7 days 7 capsule 0    doxycycline hyclate (VIBRAMYCIN) 100 MG capsule Take 1 capsule by mouth 2 times daily for 10 days 20 capsule 0    fluticasone (VERAMYST) 27.5 MCG/SPRAY nasal spray 2 sprays by Each Nostril route daily      sulfamethoxazole-trimethoprim (BACTRIM DS;SEPTRA DS) 800-160 MG per tablet take 1 tablet by mouth AFTER INTERCOURSE TO PREVENT INFECTION 30 tablet 0    meclizine (ANTIVERT) 12.5 MG tablet take 1 tablet by mouth three times a day 30 tablet 0    tolterodine (DETROL LA) 4 MG extended release capsule take 1 capsule by mouth daily if needed  0    tobramycin-dexamethasone (TOBRADEX) 0.3-0.1 % ophthalmic suspension apply 2 drops INTO AFFECTED EYE twice a day for 7 days  0    ibuprofen (IBU) 600 MG tablet Take 1 tablet by mouth every 6 hours as needed for Pain. 20 tablet 0    Multiple Vitamin (MULTIVITAMIN PO) Take  by mouth daily.         Omega-3 Fatty Acids (OMEGA 3 PO) Take 2 tablets by mouth daily.  Misc Natural Products (OSTEO BI-FLEX TRIPLE STRENGTH PO) Take 2 tablets by mouth daily.  Calcium Carbonate-Vitamin D (CALCIUM 600/VITAMIN D PO) Take  by mouth. 800 mg vit d       naproxen (NAPROSYN) 500 MG tablet Take 1 tablet by mouth 2 times daily (with meals) for 14 doses 14 tablet 0     No current facility-administered medications on file prior to visit. Review of Systems  Constitutional:Negative for activity change, appetite change, chills, fatigue and fever. Respiratory: Negative for choking, chest tightness, shortness of breath and wheezing. Cardiovascular: Negative for chest pain, palpitations and leg swelling. Gastrointestinal: Negative for abdominal distention, constipation, diarrhea, nausea and vomiting. Musculoskeletal: Negative for arthralgias, back pain, gait problem and joint swelling. Neurological: Negative for dizziness, weakness,numbness and headaches. There were no vitals taken for this visit. Physical Exam   Constitutional:  Oriented to person, place, and time. Appears well-developed and well-nourished. No acute distress. Neurological:Alert and oriented to person, place, and time. Skin: No diaphoresis. Psychiatric: Normal mood and affect. Behavior is Normal.     ASSESSMENT AND PLAN:    Meredith Lewis was seen today for positive for covid-19. Diagnoses and all orders for this visit:    COVID-19 virus infection  -     ondansetron (ZOFRAN) 4 MG tablet; Take 1 tablet by mouth 3 times daily as needed for Nausea or Vomiting        She presents today for follow-up of her Covid infection    She should continue zinc and vitamin C and doxycycline    I will add Zofran as needed for nausea and vomiting      Return if symptoms worsen or fail to improve.     Electronically signed by Moses Juarez DO on 4/27/2021 at 11:23 AM      TeleMedicine Patient Consent    This visit was performed as a virtual video visit using a synchronous, two-way, audio-video telehealth technology platform. Patient identification was verified at the start of the visit, including the patient's telephone number and physical location. I discussed with the patient the nature of our telehealth visits, that:     1. Due to the nature of an audio- video modality, the only components of a physical exam that could be done are the elements supported by direct observation. 2. I would evaluate the patient and recommend diagnostics and treatments based on my assessment. 3. If it was felt that the patient should be evaluated in clinic or an emergency room setting, then they would be directed there. 4. Our sessions are not being recorded and that personal health information is protected. 5. Our team would provide follow up care in person if/when the patient needs it. Patient Does agree to proceed with telemedicine consultation. Patient's location: home address in [place] [address]  location home address in [place]  Other people involved in call - None      Time spent: Not billed by time    This visit was completed virtually using D[employer]. me    Due to this being a TeleHealth encounter, evaluation of the following organ systems is limited: Vitals/Constitutional/EENT/Resp/CV/GI//MS/Neuro/Skin/Heme-Lymph-Imm. Pursuant to the emergency declaration under the [facility], 1135 waiver authority and the "[employer] and [employer] Act, this Virtual  Visit was conducted, with patient's consent, to reduce the patient's risk of exposure to COVID-19 and provide continuity of care for an established patient. Services were provided through a video synchronous discussion virtually to substitute for in-person clinic visit. Please note that the above documentation was prepared using voice recognition software. Every attempt was made to ensure accuracy but there may be spelling, grammatical, and contextual errors. Electronically signed by Michelle Landa DO on 4/27/2021 at 11:23 AM

## 2021-04-29 ENCOUNTER — APPOINTMENT (OUTPATIENT)
Dept: GENERAL RADIOLOGY | Age: 66
End: 2021-04-29
Payer: MEDICARE

## 2021-04-29 ENCOUNTER — HOSPITAL ENCOUNTER (EMERGENCY)
Age: 66
Discharge: HOME | End: 2021-04-29
Attending: EMERGENCY MEDICINE
Payer: MEDICARE

## 2021-04-29 VITALS
WEIGHT: 130 LBS | RESPIRATION RATE: 20 BRPM | TEMPERATURE: 99.5 F | DIASTOLIC BLOOD PRESSURE: 75 MMHG | HEART RATE: 74 BPM | SYSTOLIC BLOOD PRESSURE: 110 MMHG | BODY MASS INDEX: 19.7 KG/M2 | HEIGHT: 68 IN | OXYGEN SATURATION: 96 %

## 2021-04-29 DIAGNOSIS — R11.2 NAUSEA VOMITING AND DIARRHEA: ICD-10-CM

## 2021-04-29 DIAGNOSIS — R19.7 NAUSEA VOMITING AND DIARRHEA: ICD-10-CM

## 2021-04-29 DIAGNOSIS — U07.1 COVID-19: Primary | ICD-10-CM

## 2021-04-29 LAB
ALBUMIN SERPL-MCNC: 3.6 G/DL (ref 3.5–5.2)
ALP BLD-CCNC: 106 U/L (ref 35–104)
ALT SERPL-CCNC: 27 U/L (ref 0–32)
ANION GAP SERPL CALCULATED.3IONS-SCNC: 10 MMOL/L (ref 7–16)
AST SERPL-CCNC: 33 U/L (ref 0–31)
BASOPHILS ABSOLUTE: 0.02 E9/L (ref 0–0.2)
BASOPHILS RELATIVE PERCENT: 0.4 % (ref 0–2)
BILIRUB SERPL-MCNC: 0.3 MG/DL (ref 0–1.2)
BUN BLDV-MCNC: 21 MG/DL (ref 6–23)
CALCIUM SERPL-MCNC: 9 MG/DL (ref 8.6–10.2)
CHLORIDE BLD-SCNC: 99 MMOL/L (ref 98–107)
CO2: 26 MMOL/L (ref 22–29)
CREAT SERPL-MCNC: 1.1 MG/DL (ref 0.5–1)
EKG ATRIAL RATE: 81 BPM
EKG P AXIS: 76 DEGREES
EKG P-R INTERVAL: 130 MS
EKG Q-T INTERVAL: 342 MS
EKG QRS DURATION: 76 MS
EKG QTC CALCULATION (BAZETT): 397 MS
EKG R AXIS: 67 DEGREES
EKG T AXIS: -14 DEGREES
EKG VENTRICULAR RATE: 81 BPM
EOSINOPHILS ABSOLUTE: 0 E9/L (ref 0.05–0.5)
EOSINOPHILS RELATIVE PERCENT: 0 % (ref 0–6)
GFR AFRICAN AMERICAN: >60
GFR NON-AFRICAN AMERICAN: 50 ML/MIN/1.73
GLUCOSE BLD-MCNC: 110 MG/DL (ref 74–99)
HCT VFR BLD CALC: 44.1 % (ref 34–48)
HEMOGLOBIN: 15.1 G/DL (ref 11.5–15.5)
IMMATURE GRANULOCYTES #: 0.03 E9/L
IMMATURE GRANULOCYTES %: 0.6 % (ref 0–5)
LIPASE: 44 U/L (ref 13–60)
LYMPHOCYTES ABSOLUTE: 0.78 E9/L (ref 1.5–4)
LYMPHOCYTES RELATIVE PERCENT: 15.9 % (ref 20–42)
MCH RBC QN AUTO: 30.8 PG (ref 26–35)
MCHC RBC AUTO-ENTMCNC: 34.2 % (ref 32–34.5)
MCV RBC AUTO: 89.8 FL (ref 80–99.9)
MONOCYTES ABSOLUTE: 0.32 E9/L (ref 0.1–0.95)
MONOCYTES RELATIVE PERCENT: 6.5 % (ref 2–12)
NEUTROPHILS ABSOLUTE: 3.75 E9/L (ref 1.8–7.3)
NEUTROPHILS RELATIVE PERCENT: 76.6 % (ref 43–80)
PDW BLD-RTO: 12.1 FL (ref 11.5–15)
PLATELET # BLD: 48 E9/L (ref 130–450)
PLATELET CONFIRMATION: NORMAL
PMV BLD AUTO: 10.1 FL (ref 7–12)
POTASSIUM REFLEX MAGNESIUM: 4.1 MMOL/L (ref 3.5–5)
RBC # BLD: 4.91 E12/L (ref 3.5–5.5)
SODIUM BLD-SCNC: 135 MMOL/L (ref 132–146)
TOTAL PROTEIN: 6.6 G/DL (ref 6.4–8.3)
TROPONIN: <0.01 NG/ML (ref 0–0.03)
WBC # BLD: 4.9 E9/L (ref 4.5–11.5)

## 2021-04-29 PROCEDURE — 99285 EMERGENCY DEPT VISIT HI MDM: CPT

## 2021-04-29 PROCEDURE — 84484 ASSAY OF TROPONIN QUANT: CPT

## 2021-04-29 PROCEDURE — 2580000003 HC RX 258: Performed by: EMERGENCY MEDICINE

## 2021-04-29 PROCEDURE — 85025 COMPLETE CBC W/AUTO DIFF WBC: CPT

## 2021-04-29 PROCEDURE — 71045 X-RAY EXAM CHEST 1 VIEW: CPT

## 2021-04-29 PROCEDURE — 83690 ASSAY OF LIPASE: CPT

## 2021-04-29 PROCEDURE — 6360000002 HC RX W HCPCS: Performed by: EMERGENCY MEDICINE

## 2021-04-29 PROCEDURE — 80053 COMPREHEN METABOLIC PANEL: CPT

## 2021-04-29 PROCEDURE — 6370000000 HC RX 637 (ALT 250 FOR IP): Performed by: EMERGENCY MEDICINE

## 2021-04-29 PROCEDURE — 96374 THER/PROPH/DIAG INJ IV PUSH: CPT

## 2021-04-29 PROCEDURE — 93005 ELECTROCARDIOGRAM TRACING: CPT | Performed by: EMERGENCY MEDICINE

## 2021-04-29 PROCEDURE — 93010 ELECTROCARDIOGRAM REPORT: CPT | Performed by: INTERNAL MEDICINE

## 2021-04-29 RX ORDER — 0.9 % SODIUM CHLORIDE 0.9 %
1000 INTRAVENOUS SOLUTION INTRAVENOUS ONCE
Status: COMPLETED | OUTPATIENT
Start: 2021-04-29 | End: 2021-04-29

## 2021-04-29 RX ORDER — ONDANSETRON 8 MG/1
8 TABLET, ORALLY DISINTEGRATING ORAL EVERY 8 HOURS PRN
Qty: 12 TABLET | Refills: 0 | Status: SHIPPED | OUTPATIENT
Start: 2021-04-29

## 2021-04-29 RX ORDER — PROMETHAZINE HYDROCHLORIDE 25 MG/1
25 SUPPOSITORY RECTAL EVERY 6 HOURS PRN
Qty: 10 SUPPOSITORY | Refills: 1 | Status: SHIPPED | OUTPATIENT
Start: 2021-04-29 | End: 2021-05-06

## 2021-04-29 RX ORDER — ACETAMINOPHEN 500 MG
1000 TABLET ORAL ONCE
Status: COMPLETED | OUTPATIENT
Start: 2021-04-29 | End: 2021-04-29

## 2021-04-29 RX ORDER — PROMETHAZINE HYDROCHLORIDE 25 MG/ML
12.5 INJECTION, SOLUTION INTRAMUSCULAR; INTRAVENOUS ONCE
Status: COMPLETED | OUTPATIENT
Start: 2021-04-29 | End: 2021-04-29

## 2021-04-29 RX ORDER — DICYCLOMINE HCL 20 MG
20 TABLET ORAL 4 TIMES DAILY PRN
Qty: 20 TABLET | Refills: 0 | Status: SHIPPED | OUTPATIENT
Start: 2021-04-29 | End: 2021-05-04

## 2021-04-29 RX ADMIN — SODIUM CHLORIDE 1000 ML: 9 INJECTION, SOLUTION INTRAVENOUS at 05:13

## 2021-04-29 RX ADMIN — ACETAMINOPHEN 1000 MG: 500 TABLET ORAL at 06:24

## 2021-04-29 RX ADMIN — SODIUM CHLORIDE 1000 ML: 9 INJECTION, SOLUTION INTRAVENOUS at 07:33

## 2021-04-29 RX ADMIN — PROMETHAZINE HYDROCHLORIDE 12.5 MG: 25 INJECTION INTRAMUSCULAR; INTRAVENOUS at 05:23

## 2021-04-29 ASSESSMENT — PAIN SCALES - GENERAL: PAINLEVEL_OUTOF10: 0

## 2021-04-29 NOTE — ED NOTES
Isolation sign and cart placed outside of patient's room due to patient's report of being diagnosed with Covid on Friday. Updated patient on plan of care. Call light given to patient. Allergy and Fall risk bands applied.       Jocy Cortés RN  04/29/21 3223

## 2021-04-29 NOTE — ED NOTES
Bed: 06  Expected date:   Expected time:   Means of arrival: [facility]  Comments:  Angela Vega RN  04/29/21 9267

## 2021-04-29 NOTE — ED PROVIDER NOTES
HPI:  4/29/21,   Time: 6:00 AM EDT       Yolande Segovia is a 76 y.o. female presenting to the ED for nausea vomiting diarrhea, beginning several days ago. The complaint has been intermittent, moderate in severity, and worsened by nothing. The patient states that she was recently diagnosed with Covid 19 on Friday. She states that since being diagnosed she has had difficulty keeping anything down. She states she has had several episodes of nonbilious nonbloody vomiting as well as watery diarrhea that is nonbloody as well. She states she has benign intermittent fevers. Denies any chest pain or shortness of breath. No numbness tingling. Due to her symptoms she came to the ED to be evaluated. Review of Systems:   Pertinent positives and negatives are stated within HPI, all other systems reviewed and are negative.          --------------------------------------------- PAST HISTORY ---------------------------------------------  Past Medical History:  has a past medical history of Nausea & vomiting. Past Surgical History:  has a past surgical history that includes Dilation and curettage of uterus (1990); Cervix surgery (1990); Colonoscopy (2010); and Dilation and curettage of uterus ([id number]). Social History:  reports that she has never smoked. She has never used smokeless tobacco. She reports that she does not drink alcohol or use drugs. Family History: family history is not on file. The patients home medications have been reviewed. Allergies: Penicillins and Aspirin        ---------------------------------------------------PHYSICAL EXAM--------------------------------------    Constitutional/General: Alert and oriented x3, well appearing, non toxic in NAD  Head: Normocephalic and atraumatic  Eyes: PERRL, EOMI, conjunctive normal, sclera non icteric  Mouth: Oropharynx clear, handling secretions, no trismus, no asymmetry of the posterior oropharynx or uvular edema.   Dry mucous membranes  Neck: Metabolic Panel w/ Reflex to MG   Result Value Ref Range    Sodium 135 132 - 146 mmol/L    Potassium reflex Magnesium 4.1 3.5 - 5.0 mmol/L    Chloride 99 98 - 107 mmol/L    CO2 26 22 - 29 mmol/L    Anion Gap 10 7 - 16 mmol/L    Glucose 110 (H) 74 - 99 mg/dL    BUN 21 6 - 23 mg/dL    CREATININE 1.1 (H) 0.5 - 1.0 mg/dL    GFR Non-African American 50 >=60 mL/min/1.73    GFR African American >60     Calcium 9.0 8.6 - 10.2 mg/dL    Total Protein 6.6 6.4 - 8.3 g/dL    Albumin 3.6 3.5 - 5.2 g/dL    Total Bilirubin 0.3 0.0 - 1.2 mg/dL    Alkaline Phosphatase 106 (H) 35 - 104 U/L    ALT 27 0 - 32 U/L    AST 33 (H) 0 - 31 U/L   Lipase   Result Value Ref Range    Lipase 44 13 - 60 U/L   Troponin   Result Value Ref Range    Troponin <0.01 0.00 - 0.03 ng/mL   Platelet Confirmation   Result Value Ref Range    Platelet Confirmation CONFIRMED    EKG 12 Lead   Result Value Ref Range    Ventricular Rate 81 BPM    Atrial Rate 81 BPM    P-R Interval 130 ms    QRS Duration 76 ms    Q-T Interval 342 ms    QTc Calculation (Bazett) 397 ms    P Axis 76 degrees    R Axis 67 degrees    T Axis -14 degrees       RADIOLOGY:  Interpreted by Radiologist.  XR CHEST PORTABLE   Final Result   No pneumonia, pneumothorax or CHF. Mild hyperinflation, similar to prior study. EKG:  This EKG is signed and interpreted by the EP. EKG shows normal sinus rhythm 81 bpm.  Nonspecific ST-T wave changes noted. Normal axis. Normal QRS. No STEMI.    ------------------------- NURSING NOTES AND VITALS REVIEWED ---------------------------   The nursing notes within the ED encounter and vital signs as below have been reviewed by myself. /71   Pulse 79   Temp 101.7 °F (38.7 °C) (Oral)   Resp 24   Ht 5' 8\" (1.727 m)   Wt 130 lb (59 kg)   SpO2 95%   BMI 19.77 kg/m²   Oxygen Saturation Interpretation: Normal    The patients available past medical records and past encounters were reviewed.         ------------------------------ ED COURSE/MEDICAL DECISION MAKING----------------------  Medications   0.9 % sodium chloride bolus (0 mLs Intravenous Stopped 4/29/21 0722)   promethazine (PHENERGAN) injection 12.5 mg (12.5 mg Intravenous Given 4/29/21 0523)   acetaminophen (TYLENOL) tablet 1,000 mg (1,000 mg Oral Given 4/29/21 0624)   0.9 % sodium chloride bolus (1,000 mLs Intravenous New Bag 4/29/21 0787)         ED COURSE:       Medical Decision Making: This is a 68-year-old female who presented to the ED for nausea vomiting diarrhea. After work-up showed normal CBC. Normal chemistry except for creatinine 1.1. Normal lipase. Troponin test.  Chest x-ray shows no pneumonia pneumothorax or CHF. Patient given IV fluids and Phenergan. Patient did spike a fever and was given Tylenol. Patient be given an additional liter of fluid and then will be discharged home. The patient does appear nontoxic. She has no hypoxia. She is tolerating p.o. at this time. Return precautions given. Patient agrees with plan. I, Dr. Jose Horta, am the primary provider for this encounter    This patient's ED course included: a personal history and physicial examination, re-evaluation prior to disposition, multiple bedside re-evaluations and IV medications    This patient has remained hemodynamically stable during their ED course. Re-Evaluations:             Re-evaluation. Patients symptoms are improving          Counseling: The emergency provider has spoken with the patient and discussed todays results, in addition to providing specific details for the plan of care and counseling regarding the diagnosis and prognosis. Questions are answered at this time and they are agreeable with the plan.       --------------------------------- IMPRESSION AND DISPOSITION ---------------------------------    IMPRESSION  1. COVID-19    2.  Nausea vomiting and diarrhea        DISPOSITION  Disposition: Discharge to home  Patient condition is stable    NOTE: This report was transcribed using voice recognition software.  Every effort was made to ensure accuracy; however, inadvertent computerized transcription errors may be present        Saira Wallace DO  04/29/21 9630

## 2021-04-29 NOTE — ED NOTES
Patient notified of need for urine specimen. Call light in reach.       Jocy Cortés RN  04/29/21 6220

## 2021-04-30 ENCOUNTER — TELEPHONE (OUTPATIENT)
Dept: PRIMARY CARE CLINIC | Age: 66
End: 2021-04-30

## 2021-04-30 ENCOUNTER — CARE COORDINATION (OUTPATIENT)
Dept: CARE COORDINATION | Age: 66
End: 2021-04-30

## 2021-04-30 NOTE — TELEPHONE ENCOUNTER
FYI patient called and stated she has covid. But she was in the hospital Wednesday night for nausea and hydration. She wanted you to know that they gave her 2 bags of fluid.

## 2021-04-30 NOTE — CARE COORDINATION
Date/Time:  4/30/2021 2:24 PM  Attempted to reach patient by telephone. Call within 2 business days of discharge: Yes Left HIPPA compliant message requesting a return call. Will attempt to reach patient again.

## 2021-05-03 ENCOUNTER — CARE COORDINATION (OUTPATIENT)
Dept: CARE COORDINATION | Age: 66
End: 2021-05-03

## 2021-05-03 NOTE — CARE COORDINATION
Patient contacted regarding ODCNV-48 diagnosis\". Discussed COVID-19 related testing which was available at this time. Test results were positive. Patient informed of results, if available? Yes    LPN Care Coordinator contacted the family by telephone to perform post discharge assessment. Call within 2 business days of discharge: Yes. Verified name and  with family as identifiers. Provided introduction to self, and explanation of the CTN/ACM role, and reason for call due to risk factors for infection and/or exposure to COVID-19. Symptoms reviewed with family who verbalized the following symptoms: fever, nausea, vomiting and diarrhea. Due to no new or worsening symptoms encounter was not routed to provider for escalation. Discussed follow-up appointments. If no appointment was previously scheduled, appointment scheduling offered: [facility] follow up appointment(s): No future appointments. Non-[facility] follow up appointment(s): NA    Spoke to patient's  who is listed on patient's HIPPA form.  stated that patient is feeling better than previously, however, just taking day by day.  advised she is drinking pedialyte and also drinking Ensure to keep hydrated. Patient states they have filled all prescriptions and are taking them as written. Advised hydration with gatorade or pedialyte. Tylenol for fever, aches. Patient also advised when to return to ER, significant worsening of symptoms, especially shortness of breath or elevated temperatures. Patient voiced understanding. Non-face-to-face services provided:  Obtained and reviewed discharge summary and/or continuity of care documents  Education of patient/family/caregiver/guardian to support self-management-symptom management     Advance Care Planning:   Does patient have an Advance Directive:  decision maker updated. Patient has following risk factors of: no known risk factors.  LPN CC reviewed discharge instructions, medical

## 2021-05-11 ENCOUNTER — CARE COORDINATION (OUTPATIENT)
Dept: CARE COORDINATION | Age: 66
End: 2021-05-11

## 2021-05-18 ENCOUNTER — TELEPHONE (OUTPATIENT)
Dept: PRIMARY CARE CLINIC | Age: 66
End: 2021-05-18

## 2021-05-18 NOTE — LETTER
[facility]  [address] [place] [address]  Phone: [phone]  Fax: [address] [place], [place]        May 18, 2021     Patient: Bard Armstrong   YOB: 1955   Date of Visit: 5/18/2021       To Whom it May Concern:    Bard Armstrong may return to work on May 26 without restriction    If you have any questions or concerns, please don't hesitate to call.     Sincerely,         Jaky Shirley, DO

## 2021-05-18 NOTE — TELEPHONE ENCOUNTER
Patient called and wanted a release for work. She had covid and is now feeling better to go back. She wants to go back Wednesday May 26th 2021. She wanted to know if she needs to see you before you can release her or if she can just get a letter.

## 2022-10-13 LAB — MAMMOGRAPHY, EXTERNAL: NORMAL

## 2022-10-26 ENCOUNTER — OFFICE VISIT (OUTPATIENT)
Dept: PRIMARY CARE CLINIC | Age: 67
End: 2022-10-26
Payer: MEDICARE

## 2022-10-26 VITALS
HEIGHT: 68 IN | BODY MASS INDEX: 21.37 KG/M2 | OXYGEN SATURATION: 95 % | SYSTOLIC BLOOD PRESSURE: 124 MMHG | HEART RATE: 80 BPM | TEMPERATURE: 98.1 F | DIASTOLIC BLOOD PRESSURE: 82 MMHG | RESPIRATION RATE: 12 BRPM | WEIGHT: 141 LBS

## 2022-10-26 DIAGNOSIS — Z13.1 DIABETES MELLITUS SCREENING: ICD-10-CM

## 2022-10-26 DIAGNOSIS — N39.0 FREQUENT UTI: ICD-10-CM

## 2022-10-26 DIAGNOSIS — Z91.89 AT INCREASED RISK OF EXPOSURE TO COVID-19 VIRUS: ICD-10-CM

## 2022-10-26 DIAGNOSIS — H83.09 LABYRINTHITIS, UNSPECIFIED LATERALITY: ICD-10-CM

## 2022-10-26 DIAGNOSIS — Z13.220 SCREENING CHOLESTEROL LEVEL: ICD-10-CM

## 2022-10-26 DIAGNOSIS — Z00.00 INITIAL MEDICARE ANNUAL WELLNESS VISIT: Primary | ICD-10-CM

## 2022-10-26 DIAGNOSIS — G47.00 INSOMNIA, UNSPECIFIED TYPE: ICD-10-CM

## 2022-10-26 DIAGNOSIS — D69.6 THROMBOCYTOPENIA (HCC): ICD-10-CM

## 2022-10-26 LAB
HCT VFR BLD CALC: 43.6 % (ref 34–48)
HEMOGLOBIN: 14.2 G/DL (ref 11.5–15.5)
MCH RBC QN AUTO: 30.7 PG (ref 26–35)
MCHC RBC AUTO-ENTMCNC: 32.6 % (ref 32–34.5)
MCV RBC AUTO: 94.4 FL (ref 80–99.9)
PDW BLD-RTO: 12.2 FL (ref 11.5–15)
PLATELET # BLD: 117 E9/L (ref 130–450)
PMV BLD AUTO: 10 FL (ref 7–12)
RBC # BLD: 4.62 E12/L (ref 3.5–5.5)
SARS-COV-2 ANTIBODY, TOTAL: REACTIVE
WBC # BLD: 5.2 E9/L (ref 4.5–11.5)

## 2022-10-26 PROCEDURE — 1123F ACP DISCUSS/DSCN MKR DOCD: CPT | Performed by: INTERNAL MEDICINE

## 2022-10-26 PROCEDURE — G8484 FLU IMMUNIZE NO ADMIN: HCPCS | Performed by: INTERNAL MEDICINE

## 2022-10-26 PROCEDURE — G0438 PPPS, INITIAL VISIT: HCPCS | Performed by: INTERNAL MEDICINE

## 2022-10-26 PROCEDURE — 3017F COLORECTAL CA SCREEN DOC REV: CPT | Performed by: INTERNAL MEDICINE

## 2022-10-26 RX ORDER — SULFAMETHOXAZOLE AND TRIMETHOPRIM 800; 160 MG/1; MG/1
TABLET ORAL
Qty: 30 TABLET | Refills: 1 | Status: SHIPPED | OUTPATIENT
Start: 2022-10-26

## 2022-10-26 RX ORDER — TRAZODONE HYDROCHLORIDE 50 MG/1
50 TABLET ORAL NIGHTLY PRN
Qty: 30 TABLET | Refills: 0 | Status: SHIPPED | OUTPATIENT
Start: 2022-10-26

## 2022-10-26 RX ORDER — MECLIZINE HCL 12.5 MG/1
TABLET ORAL
Qty: 30 TABLET | Refills: 3 | Status: SHIPPED | OUTPATIENT
Start: 2022-10-26

## 2022-10-26 SDOH — ECONOMIC STABILITY: FOOD INSECURITY: WITHIN THE PAST 12 MONTHS, THE FOOD YOU BOUGHT JUST DIDN'T LAST AND YOU DIDN'T HAVE MONEY TO GET MORE.: NEVER TRUE

## 2022-10-26 SDOH — ECONOMIC STABILITY: FOOD INSECURITY: WITHIN THE PAST 12 MONTHS, YOU WORRIED THAT YOUR FOOD WOULD RUN OUT BEFORE YOU GOT MONEY TO BUY MORE.: NEVER TRUE

## 2022-10-26 ASSESSMENT — PATIENT HEALTH QUESTIONNAIRE - PHQ9
SUM OF ALL RESPONSES TO PHQ QUESTIONS 1-9: 0
1. LITTLE INTEREST OR PLEASURE IN DOING THINGS: 0
SUM OF ALL RESPONSES TO PHQ9 QUESTIONS 1 & 2: 0
2. FEELING DOWN, DEPRESSED OR HOPELESS: 0
SUM OF ALL RESPONSES TO PHQ QUESTIONS 1-9: 0

## 2022-10-26 ASSESSMENT — LIFESTYLE VARIABLES
HOW OFTEN DO YOU HAVE A DRINK CONTAINING ALCOHOL: 2-3 TIMES A WEEK
HOW MANY STANDARD DRINKS CONTAINING ALCOHOL DO YOU HAVE ON A TYPICAL DAY: 1 OR 2

## 2022-10-26 ASSESSMENT — SOCIAL DETERMINANTS OF HEALTH (SDOH): HOW HARD IS IT FOR YOU TO PAY FOR THE VERY BASICS LIKE FOOD, HOUSING, MEDICAL CARE, AND HEATING?: NOT HARD AT ALL

## 2022-10-26 NOTE — PROGRESS NOTES
Medicare Annual Wellness Visit    Dhruv Garcia is here for Medicare AWV    Assessment & Plan   Initial Medicare annual wellness visit  Frequent UTI  -     sulfamethoxazole-trimethoprim (BACTRIM DS;SEPTRA DS) 800-160 MG per tablet; take 1 tablet by mouth AFTER INTERCOURSE TO PREVENT INFECTION, Disp-30 tablet, R-1Normal  Labyrinthitis, unspecified laterality  -     meclizine (ANTIVERT) 12.5 MG tablet; take 1 tablet by mouth three times a day, Disp-30 tablet, R-3Normal    Recommendations for Preventive Services Due: see orders and patient instructions/AVS.  Recommended screening schedule for the next 5-10 years is provided to the patient in written form: see Patient Instructions/AVS.     Return for Medicare Annual Wellness Visit in 1 year. Subjective   The following acute and/or chronic problems were also addressed today:  See below      Patient's complete Health Risk Assessment and screening values have been reviewed and are found in Flowsheets. The following problems were reviewed today and where indicated follow up appointments were made and/or referrals ordered.     Positive Risk Factor Screenings with Interventions:             General Health and ACP:  General  In general, how would you say your health is?: Very Good  In the past 7 days, have you experienced any of the following: New or Increased Pain, New or Increased Fatigue, Loneliness, Social Isolation, Stress or Anger?: No  Do you get the social and emotional support that you need?: Yes  Do you have a Living Will?: Yes    Advance Directives       Power of  Living Will ACP-Advance Directive ACP-Power of     Not on File Not on File Not on File Not on File        General Health Risk Interventions:  No Living Will: Advance Care Planning addressed with patient today      Safety:  Do you have working smoke detectors?: Yes  Do you have any tripping hazards - loose or unsecured carpets or rugs?: No  Do you have any tripping hazards - clutter in doorways, halls, or stairs?: No  Do you have either shower bars, grab bars, non-slip mats or non-slip surfaces in your shower or bathtub?: Yes  Do all of your stairways have a railing or banister?: (!) No  Do you always fasten your seatbelt when you are in a car?: Yes  Safety Interventions:  Home safety tips provided           Objective   Vitals:    10/26/22 0835   BP: 124/82   Pulse: 80   Resp: 12   Temp: 98.1 °F (36.7 °C)   SpO2: 95%   Weight: 141 lb (64 kg)   Height: 5' 8\" (1.727 m)      Body mass index is 21.44 kg/m². General Appearance: alert and oriented to person, place and time, well developed and well- nourished, in no acute distress  Skin: warm and dry, no rash or erythema  Head: normocephalic and atraumatic  Eyes: pupils equal, round, and reactive to light, extraocular eye movements intact, conjunctivae normal  ENT: tympanic membrane, external ear and ear canal normal bilaterally, nose without deformity, nasal mucosa and turbinates normal without polyps  Neck: supple and non-tender without mass, no thyromegaly or thyroid nodules, no cervical lymphadenopathy  Pulmonary/Chest: clear to auscultation bilaterally- no wheezes, rales or rhonchi, normal air movement, no respiratory distress  Cardiovascular: normal rate, regular rhythm, normal S1 and S2, no murmurs, rubs, clicks, or gallops, distal pulses intact, no carotid bruits  Abdomen: soft, non-tender, non-distended, normal bowel sounds, no masses or organomegaly  Extremities: no cyanosis, clubbing or edema  Musculoskeletal: normal range of motion, no joint swelling, deformity or tenderness  Neurologic: reflexes normal and symmetric, no cranial nerve deficit, gait, coordination and speech normal       Allergies   Allergen Reactions    Penicillins Rash    Aspirin Other (See Comments)     Ears ring     Prior to Visit Medications    Medication Sig Taking?  Authorizing Provider   sulfamethoxazole-trimethoprim (BACTRIM DS;SEPTRA DS) 800-160 MG per tablet take 1 tablet by mouth AFTER INTERCOURSE TO PREVENT INFECTION Yes Cordell Jensen DO   meclizine (ANTIVERT) 12.5 MG tablet take 1 tablet by mouth three times a day Yes Cordell Jensen DO   Ascorbic Acid (VITAMIN C) 500 MG CHEW Take 1 tablet by mouth 2 times daily for 7 days Yes Juan Manuel Bolanos., APRN - CNP   zinc sulfate (ZINC-220) 220 (50 Zn) MG capsule Take 1 capsule by mouth daily for 7 days Yes Juan Manuel Hebert, APRN - CNP   fluticasone (VERAMYST) 27.5 MCG/SPRAY nasal spray 2 sprays by Each Nostril route daily Yes Historical Provider, MD   tolterodine (DETROL LA) 4 MG extended release capsule take 1 capsule by mouth daily if needed Yes Historical Provider, MD   ibuprofen (IBU) 600 MG tablet Take 1 tablet by mouth every 6 hours as needed for Pain. Yes Clarence Duke MD   Multiple Vitamin (MULTIVITAMIN PO) Take  by mouth daily. Yes Historical Provider, MD   Omega-3 Fatty Acids (OMEGA 3 PO) Take 2 tablets by mouth daily. Yes Historical Provider, MD   Misc Natural Products (OSTEO BI-FLEX TRIPLE STRENGTH PO) Take 2 tablets by mouth daily. Yes Historical Provider, MD   Calcium Carbonate-Vitamin D (CALCIUM 600/VITAMIN D PO) Take  by mouth.  800 mg vit d  Yes Historical Provider, MD   ondansetron (ZOFRAN ODT) 8 MG TBDP disintegrating tablet Place 1 tablet under the tongue every 8 hours as needed for Nausea  Patient not taking: Reported on 10/26/2022  Cora Eaton DO   dicyclomine (BENTYL) 20 MG tablet Take 1 tablet by mouth 4 times daily as needed (pain)  Cora Eaton DO   ondansetron (ZOFRAN) 4 MG tablet Take 1 tablet by mouth 3 times daily as needed for Nausea or Vomiting  Patient not taking: Reported on 10/26/2022  Cordell Jensen DO   naproxen (NAPROSYN) 500 MG tablet Take 1 tablet by mouth 2 times daily (with meals) for 14 doses  Cordell Jensen DO   tobramycin-dexamethasone (TOBRADEX) 0.3-0.1 % ophthalmic suspension apply 2 drops INTO AFFECTED EYE twice a day for 7 days  Patient not taking: Reported on 10/26/2022  Historical Provider, MD Schreiber (Including outside providers/suppliers regularly involved in providing care):   Patient Care Team:  Madisyn Jaime DO as PCP - General (Internal Medicine)  Madisyn Jaime DO as PCP - [facility] Empaneled Provider     Reviewed and updated this visit:  Tobacco  Allergies  Meds  Med Hx  Surg Hx  Soc Hx  Fam Hx          Insomnia - failed OTC, start trazodone  Obtain routine blood work

## 2022-10-26 NOTE — PATIENT INSTRUCTIONS
Personalized Preventive Plan for Matthew Pagan - 10/26/2022  Medicare offers a range of preventive health benefits. Some of the tests and screenings are paid in full while other may be subject to a deductible, co-insurance, and/or copay. Some of these benefits include a comprehensive review of your medical history including lifestyle, illnesses that may run in your family, and various assessments and screenings as appropriate. After reviewing your medical record and screening and assessments performed today your provider may have ordered immunizations, labs, imaging, and/or referrals for you. A list of these orders (if applicable) as well as your Preventive Care list are included within your After Visit Summary for your review. Other Preventive Recommendations:    A preventive eye exam performed by an eye specialist is recommended every 1-2 years to screen for glaucoma; cataracts, macular degeneration, and other eye disorders. A preventive dental visit is recommended every 6 months. Try to get at least 150 minutes of exercise per week or 10,000 steps per day on a pedometer . Order or download the FREE \"Exercise & Physical Activity: Your Everyday Guide\" from [employer]. Call 9-812.846.9847 or search [employer] online. You need 1337-1317 mg of calcium and 6323-4048 IU of vitamin D per day. It is possible to meet your calcium requirement with diet alone, but a vitamin D supplement is usually necessary to meet this goal.  When exposed to the sun, use a sunscreen that protects against both UVA and UVB radiation with an SPF of 30 or greater. Reapply every 2 to 3 hours or after sweating, drying off with a towel, or swimming. Always wear a seat belt when traveling in a car. Always wear a helmet when riding a bicycle or motorcycle.

## 2023-04-24 SDOH — ECONOMIC STABILITY: HOUSING INSECURITY
IN THE LAST 12 MONTHS, WAS THERE A TIME WHEN YOU DID NOT HAVE A STEADY PLACE TO SLEEP OR SLEPT IN A SHELTER (INCLUDING NOW)?: NO

## 2023-04-24 SDOH — ECONOMIC STABILITY: FOOD INSECURITY: WITHIN THE PAST 12 MONTHS, THE FOOD YOU BOUGHT JUST DIDN'T LAST AND YOU DIDN'T HAVE MONEY TO GET MORE.: NEVER TRUE

## 2023-04-24 SDOH — ECONOMIC STABILITY: TRANSPORTATION INSECURITY
IN THE PAST 12 MONTHS, HAS LACK OF TRANSPORTATION KEPT YOU FROM MEETINGS, WORK, OR FROM GETTING THINGS NEEDED FOR DAILY LIVING?: NO

## 2023-04-24 SDOH — ECONOMIC STABILITY: INCOME INSECURITY: HOW HARD IS IT FOR YOU TO PAY FOR THE VERY BASICS LIKE FOOD, HOUSING, MEDICAL CARE, AND HEATING?: NOT HARD AT ALL

## 2023-04-24 SDOH — ECONOMIC STABILITY: FOOD INSECURITY: WITHIN THE PAST 12 MONTHS, YOU WORRIED THAT YOUR FOOD WOULD RUN OUT BEFORE YOU GOT MONEY TO BUY MORE.: NEVER TRUE

## 2023-04-26 ENCOUNTER — OFFICE VISIT (OUTPATIENT)
Dept: PRIMARY CARE CLINIC | Age: 68
End: 2023-04-26
Payer: MEDICARE

## 2023-04-26 VITALS
DIASTOLIC BLOOD PRESSURE: 82 MMHG | SYSTOLIC BLOOD PRESSURE: 120 MMHG | TEMPERATURE: 97.2 F | RESPIRATION RATE: 12 BRPM | BODY MASS INDEX: 20.61 KG/M2 | HEART RATE: 78 BPM | HEIGHT: 68 IN | WEIGHT: 136 LBS | OXYGEN SATURATION: 98 %

## 2023-04-26 DIAGNOSIS — Z13.220 SCREENING CHOLESTEROL LEVEL: ICD-10-CM

## 2023-04-26 DIAGNOSIS — J40 BRONCHITIS: ICD-10-CM

## 2023-04-26 DIAGNOSIS — Z20.822 CLOSE EXPOSURE TO COVID-19 VIRUS: ICD-10-CM

## 2023-04-26 DIAGNOSIS — G47.00 INSOMNIA, UNSPECIFIED TYPE: ICD-10-CM

## 2023-04-26 DIAGNOSIS — D69.6 THROMBOCYTOPENIA (HCC): Primary | Chronic | ICD-10-CM

## 2023-04-26 DIAGNOSIS — H83.09 LABYRINTHITIS, UNSPECIFIED LATERALITY: ICD-10-CM

## 2023-04-26 LAB
ALBUMIN SERPL-MCNC: 4.6 G/DL (ref 3.5–5.2)
ALP SERPL-CCNC: 77 U/L (ref 35–104)
ALT SERPL-CCNC: 14 U/L (ref 0–32)
ANION GAP SERPL CALCULATED.3IONS-SCNC: 17 MMOL/L (ref 7–16)
AST SERPL-CCNC: 21 U/L (ref 0–31)
BILIRUB SERPL-MCNC: 0.3 MG/DL (ref 0–1.2)
BUN SERPL-MCNC: 17 MG/DL (ref 6–23)
CALCIUM SERPL-MCNC: 10.2 MG/DL (ref 8.6–10.2)
CHLORIDE SERPL-SCNC: 105 MMOL/L (ref 98–107)
CHOLESTEROL, TOTAL: 233 MG/DL (ref 0–199)
CO2 SERPL-SCNC: 23 MMOL/L (ref 22–29)
CREAT SERPL-MCNC: 1.3 MG/DL (ref 0.5–1)
ERYTHROCYTE [DISTWIDTH] IN BLOOD BY AUTOMATED COUNT: 12.3 FL (ref 11.5–15)
GLUCOSE SERPL-MCNC: 94 MG/DL (ref 74–99)
HCT VFR BLD AUTO: 44.1 % (ref 34–48)
HDLC SERPL-MCNC: 62 MG/DL
HGB BLD-MCNC: 14.3 G/DL (ref 11.5–15.5)
LDLC SERPL CALC-MCNC: 144 MG/DL (ref 0–99)
MCH RBC QN AUTO: 30.7 PG (ref 26–35)
MCHC RBC AUTO-ENTMCNC: 32.4 % (ref 32–34.5)
MCV RBC AUTO: 94.6 FL (ref 80–99.9)
PLATELET # BLD AUTO: 112 E9/L (ref 130–450)
PMV BLD AUTO: 10.5 FL (ref 7–12)
POTASSIUM SERPL-SCNC: 5 MMOL/L (ref 3.5–5)
PROT SERPL-MCNC: 7.2 G/DL (ref 6.4–8.3)
RBC # BLD AUTO: 4.66 E12/L (ref 3.5–5.5)
SODIUM SERPL-SCNC: 145 MMOL/L (ref 132–146)
TRIGL SERPL-MCNC: 134 MG/DL (ref 0–149)
VLDLC SERPL CALC-MCNC: 27 MG/DL
WBC # BLD: 5.2 E9/L (ref 4.5–11.5)

## 2023-04-26 PROCEDURE — 1123F ACP DISCUSS/DSCN MKR DOCD: CPT | Performed by: INTERNAL MEDICINE

## 2023-04-26 PROCEDURE — 1090F PRES/ABSN URINE INCON ASSESS: CPT | Performed by: INTERNAL MEDICINE

## 2023-04-26 PROCEDURE — 3017F COLORECTAL CA SCREEN DOC REV: CPT | Performed by: INTERNAL MEDICINE

## 2023-04-26 PROCEDURE — 1036F TOBACCO NON-USER: CPT | Performed by: INTERNAL MEDICINE

## 2023-04-26 PROCEDURE — 99214 OFFICE O/P EST MOD 30 MIN: CPT | Performed by: INTERNAL MEDICINE

## 2023-04-26 PROCEDURE — G8420 CALC BMI NORM PARAMETERS: HCPCS | Performed by: INTERNAL MEDICINE

## 2023-04-26 PROCEDURE — G8400 PT W/DXA NO RESULTS DOC: HCPCS | Performed by: INTERNAL MEDICINE

## 2023-04-26 PROCEDURE — G8427 DOCREV CUR MEDS BY ELIG CLIN: HCPCS | Performed by: INTERNAL MEDICINE

## 2023-04-26 RX ORDER — BENZONATATE 100 MG/1
100 CAPSULE ORAL 2 TIMES DAILY PRN
Qty: 20 CAPSULE | Refills: 0 | Status: SHIPPED | OUTPATIENT
Start: 2023-04-26 | End: 2023-05-03

## 2023-04-26 ASSESSMENT — PATIENT HEALTH QUESTIONNAIRE - PHQ9
SUM OF ALL RESPONSES TO PHQ QUESTIONS 1-9: 0
SUM OF ALL RESPONSES TO PHQ QUESTIONS 1-9: 0
2. FEELING DOWN, DEPRESSED OR HOPELESS: 0
SUM OF ALL RESPONSES TO PHQ QUESTIONS 1-9: 0
SUM OF ALL RESPONSES TO PHQ QUESTIONS 1-9: 0
SUM OF ALL RESPONSES TO PHQ9 QUESTIONS 1 & 2: 0
1. LITTLE INTEREST OR PLEASURE IN DOING THINGS: 0

## 2023-04-26 NOTE — PROGRESS NOTES
4/26/23    Lenton Curling, a female of 75 y.o. presents today for 6 Month Follow-Up    She denies chest pain shortness of breath palpitations or edema. She denies any issues or side effects with medications. Diagnoses and all orders for this visit:  Thrombocytopenia ([facility] [address]  Labyrinthitis, unspecified laterality  Bronchitis  -     benzonatate (TESSALON) 100 MG capsule; Take 1 capsule by mouth 2 times daily as needed for Cough  Close exposure to COVID-19 virus  -     Covid-19, Antibody; Future  Screening cholesterol level  -     Lipid Panel; Future  Insomnia, unspecified type  -     CBC; Future  -     Comprehensive Metabolic Panel; Future      Plan  Obtain general blood work  Continue current medication regimen  Irrigate L ear for impacted cerumen  Could not tolerate trazodone, cont OTC's for now          /82   Pulse 78   Temp 97.2 °F (36.2 °C)   Resp 12   Ht 5' 8\" (1.727 m)   Wt 136 lb (61.7 kg)   SpO2 98%   BMI 20.68 kg/m²     Review of Systems  Constitutional:Negative for activity change, appetite change, chills, fatigue and fever. Respiratory: Negative for choking, chest tightness, shortness of breath and wheezing. Cardiovascular: Negative for chest pain, palpitations and leg swelling. Gastrointestinal: Negative for abdominal distention, constipation, diarrhea, nausea and vomiting. Musculoskeletal: Negative for arthralgias, back pain, gait problem and joint swelling. Neurological: Negative for dizziness, weakness,numbness and headaches.      Patient Active Problem List    Diagnosis Date Noted    Thrombocytopenia ([facility] [address] 08/22/2012    Endometrial polyp 06/27/2012     Allergies   Allergen Reactions    Penicillins Rash    Aspirin Other (See Comments)     Ears ring     Current Outpatient Medications on File Prior to Visit   Medication Sig Dispense Refill    sulfamethoxazole-trimethoprim (BACTRIM DS;SEPTRA DS) 800-160 MG per tablet take 1 tablet by mouth AFTER INTERCOURSE TO PREVENT INFECTION 30

## 2023-05-01 LAB — SARS-COV-2 IGG SERPLBLD QL IA.RAPID: NEGATIVE

## 2023-05-04 DIAGNOSIS — R94.4 ABNORMAL RENAL FUNCTION TEST: Primary | ICD-10-CM

## 2023-05-30 ENCOUNTER — HOSPITAL ENCOUNTER (OUTPATIENT)
Age: 68
Discharge: HOME | End: 2023-05-30
Payer: MEDICARE

## 2023-05-30 DIAGNOSIS — R94.4 ABNORMAL RENAL FUNCTION TEST: ICD-10-CM

## 2023-05-30 LAB
ALBUMIN SERPL-MCNC: 4.7 G/DL (ref 3.5–5.2)
ALP SERPL-CCNC: 82 U/L (ref 35–104)
ALT SERPL-CCNC: 14 U/L (ref 0–32)
ANION GAP SERPL CALCULATED.3IONS-SCNC: 12 MMOL/L (ref 7–16)
AST SERPL-CCNC: 21 U/L (ref 0–31)
BILIRUB SERPL-MCNC: 0.4 MG/DL (ref 0–1.2)
BUN SERPL-MCNC: 20 MG/DL (ref 6–23)
CALCIUM SERPL-MCNC: 10 MG/DL (ref 8.6–10.2)
CHLORIDE SERPL-SCNC: 105 MMOL/L (ref 98–107)
CO2 SERPL-SCNC: 26 MMOL/L (ref 22–29)
CREAT SERPL-MCNC: 1.2 MG/DL (ref 0.5–1)
GLUCOSE SERPL-MCNC: 95 MG/DL (ref 74–99)
POTASSIUM SERPL-SCNC: 4.8 MMOL/L (ref 3.5–5)
PROT SERPL-MCNC: 7.3 G/DL (ref 6.4–8.3)
SODIUM SERPL-SCNC: 143 MMOL/L (ref 132–146)

## 2023-05-30 PROCEDURE — 36415 COLL VENOUS BLD VENIPUNCTURE: CPT

## 2023-05-30 PROCEDURE — 80053 COMPREHEN METABOLIC PANEL: CPT

## 2023-11-02 LAB — MAMMOGRAPHY, EXTERNAL: NORMAL

## 2024-02-01 ENCOUNTER — OFFICE VISIT (OUTPATIENT)
Dept: PRIMARY CARE CLINIC | Age: 69
End: 2024-02-01
Payer: MEDICARE

## 2024-02-01 VITALS
OXYGEN SATURATION: 97 % | HEART RATE: 66 BPM | BODY MASS INDEX: 20.07 KG/M2 | DIASTOLIC BLOOD PRESSURE: 80 MMHG | SYSTOLIC BLOOD PRESSURE: 130 MMHG | TEMPERATURE: 97.2 F | WEIGHT: 132 LBS

## 2024-02-01 DIAGNOSIS — J40 BRONCHITIS: ICD-10-CM

## 2024-02-01 PROCEDURE — 1123F ACP DISCUSS/DSCN MKR DOCD: CPT | Performed by: INTERNAL MEDICINE

## 2024-02-01 PROCEDURE — 3017F COLORECTAL CA SCREEN DOC REV: CPT | Performed by: INTERNAL MEDICINE

## 2024-02-01 PROCEDURE — G8484 FLU IMMUNIZE NO ADMIN: HCPCS | Performed by: INTERNAL MEDICINE

## 2024-02-01 PROCEDURE — 1036F TOBACCO NON-USER: CPT | Performed by: INTERNAL MEDICINE

## 2024-02-01 PROCEDURE — 99213 OFFICE O/P EST LOW 20 MIN: CPT | Performed by: INTERNAL MEDICINE

## 2024-02-01 PROCEDURE — 1090F PRES/ABSN URINE INCON ASSESS: CPT | Performed by: INTERNAL MEDICINE

## 2024-02-01 PROCEDURE — G8400 PT W/DXA NO RESULTS DOC: HCPCS | Performed by: INTERNAL MEDICINE

## 2024-02-01 PROCEDURE — G8420 CALC BMI NORM PARAMETERS: HCPCS | Performed by: INTERNAL MEDICINE

## 2024-02-01 PROCEDURE — G8427 DOCREV CUR MEDS BY ELIG CLIN: HCPCS | Performed by: INTERNAL MEDICINE

## 2024-02-01 RX ORDER — BENZONATATE 100 MG/1
100 CAPSULE ORAL 2 TIMES DAILY PRN
Qty: 20 CAPSULE | Refills: 0 | Status: SHIPPED | OUTPATIENT
Start: 2024-02-01 | End: 2024-02-08

## 2024-02-01 RX ORDER — DOXYCYCLINE HYCLATE 100 MG
100 TABLET ORAL 2 TIMES DAILY
Qty: 14 TABLET | Refills: 0 | Status: SHIPPED | OUTPATIENT
Start: 2024-02-01 | End: 2024-02-08

## 2024-02-01 RX ORDER — METHYLPREDNISOLONE 4 MG/1
TABLET ORAL
Qty: 1 KIT | Refills: 0 | Status: SHIPPED | OUTPATIENT
Start: 2024-02-01

## 2024-02-01 ASSESSMENT — PATIENT HEALTH QUESTIONNAIRE - PHQ9
SUM OF ALL RESPONSES TO PHQ9 QUESTIONS 1 & 2: 0
SUM OF ALL RESPONSES TO PHQ QUESTIONS 1-9: 0
1. LITTLE INTEREST OR PLEASURE IN DOING THINGS: 0
1. LITTLE INTEREST OR PLEASURE IN DOING THINGS: NOT AT ALL
2. FEELING DOWN, DEPRESSED OR HOPELESS: 0
2. FEELING DOWN, DEPRESSED OR HOPELESS: NOT AT ALL
SUM OF ALL RESPONSES TO PHQ9 QUESTIONS 1 & 2: 0

## 2024-02-01 NOTE — PROGRESS NOTES
2/1/24    Mayela Aguilera, a female of 68 y.o. presents today for Sinusitis            Diagnoses and all orders for this visit:  Bronchitis  -     benzonatate (TESSALON) 100 MG capsule; Take 1 capsule by mouth 2 times daily as needed for Cough  Other orders  -     doxycycline hyclate (VIBRA-TABS) 100 MG tablet; Take 1 tablet by mouth 2 times daily for 7 days  -     methylPREDNISolone (MEDROL DOSEPACK) 4 MG tablet; Take by mouth.            Electronically signed by Teddy Jensen DO on 2/1/2024 at 12:29 PM                          /80   Pulse 66   Temp 97.2 °F (36.2 °C)   Wt 59.9 kg (132 lb)   SpO2 97%   BMI 20.07 kg/m²     Review of Systems  Constitutional:Negative for activity change, appetite change, chills, fatigue and fever.   Respiratory: Negative for choking, chest tightness, shortness of breath and wheezing.  Cardiovascular: Negative for chest pain, palpitations and leg swelling.   Gastrointestinal: Negative for abdominal distention, constipation, diarrhea, nausea and vomiting.   Musculoskeletal: Negative for arthralgias, back pain, gait problem and joint swelling.   Neurological: Negative for dizziness, weakness,numbness and headaches.     Patient Active Problem List    Diagnosis Date Noted    Endometrial polyp 06/27/2012     Allergies   Allergen Reactions    Penicillins Rash    Aspirin Other (See Comments)     Ears ring     Current Outpatient Medications on File Prior to Visit   Medication Sig Dispense Refill    sulfamethoxazole-trimethoprim (BACTRIM DS;SEPTRA DS) 800-160 MG per tablet take 1 tablet by mouth AFTER INTERCOURSE TO PREVENT INFECTION 30 tablet 1    meclizine (ANTIVERT) 12.5 MG tablet take 1 tablet by mouth three times a day 30 tablet 3    ondansetron (ZOFRAN ODT) 8 MG TBDP disintegrating tablet Place 1 tablet under the tongue every 8 hours as needed for Nausea 12 tablet 0    fluticasone (VERAMYST) 27.5 MCG/SPRAY nasal spray 2 sprays by Each Nostril route daily

## 2024-06-10 SDOH — HEALTH STABILITY: PHYSICAL HEALTH: ON AVERAGE, HOW MANY DAYS PER WEEK DO YOU ENGAGE IN MODERATE TO STRENUOUS EXERCISE (LIKE A BRISK WALK)?: 4 DAYS

## 2024-06-10 SDOH — ECONOMIC STABILITY: FOOD INSECURITY: WITHIN THE PAST 12 MONTHS, YOU WORRIED THAT YOUR FOOD WOULD RUN OUT BEFORE YOU GOT MONEY TO BUY MORE.: NEVER TRUE

## 2024-06-10 SDOH — ECONOMIC STABILITY: INCOME INSECURITY: HOW HARD IS IT FOR YOU TO PAY FOR THE VERY BASICS LIKE FOOD, HOUSING, MEDICAL CARE, AND HEATING?: NOT HARD AT ALL

## 2024-06-10 SDOH — ECONOMIC STABILITY: FOOD INSECURITY: WITHIN THE PAST 12 MONTHS, THE FOOD YOU BOUGHT JUST DIDN'T LAST AND YOU DIDN'T HAVE MONEY TO GET MORE.: NEVER TRUE

## 2024-06-10 SDOH — HEALTH STABILITY: PHYSICAL HEALTH: ON AVERAGE, HOW MANY MINUTES DO YOU ENGAGE IN EXERCISE AT THIS LEVEL?: 90 MIN

## 2024-06-10 ASSESSMENT — PATIENT HEALTH QUESTIONNAIRE - PHQ9
2. FEELING DOWN, DEPRESSED OR HOPELESS: NOT AT ALL
SUM OF ALL RESPONSES TO PHQ QUESTIONS 1-9: 0
SUM OF ALL RESPONSES TO PHQ9 QUESTIONS 1 & 2: 0
1. LITTLE INTEREST OR PLEASURE IN DOING THINGS: NOT AT ALL
SUM OF ALL RESPONSES TO PHQ QUESTIONS 1-9: 0

## 2024-06-10 ASSESSMENT — LIFESTYLE VARIABLES
HOW MANY STANDARD DRINKS CONTAINING ALCOHOL DO YOU HAVE ON A TYPICAL DAY: 1 OR 2
HOW OFTEN DO YOU HAVE A DRINK CONTAINING ALCOHOL: 3
HOW OFTEN DO YOU HAVE SIX OR MORE DRINKS ON ONE OCCASION: 1
HOW OFTEN DO YOU HAVE A DRINK CONTAINING ALCOHOL: 2-4 TIMES A MONTH
HOW MANY STANDARD DRINKS CONTAINING ALCOHOL DO YOU HAVE ON A TYPICAL DAY: 1

## 2024-06-11 ENCOUNTER — OFFICE VISIT (OUTPATIENT)
Dept: PRIMARY CARE CLINIC | Age: 69
End: 2024-06-11
Payer: MEDICARE

## 2024-06-11 VITALS
WEIGHT: 132 LBS | DIASTOLIC BLOOD PRESSURE: 88 MMHG | OXYGEN SATURATION: 97 % | HEART RATE: 72 BPM | SYSTOLIC BLOOD PRESSURE: 138 MMHG | BODY MASS INDEX: 20 KG/M2 | RESPIRATION RATE: 16 BRPM | HEIGHT: 68 IN | TEMPERATURE: 97.4 F

## 2024-06-11 DIAGNOSIS — E55.9 VITAMIN D DEFICIENCY: ICD-10-CM

## 2024-06-11 DIAGNOSIS — N18.9 CHRONIC KIDNEY DISEASE, UNSPECIFIED CKD STAGE: ICD-10-CM

## 2024-06-11 DIAGNOSIS — Z11.59 NEED FOR HEPATITIS C SCREENING TEST: ICD-10-CM

## 2024-06-11 DIAGNOSIS — E78.00 HYPERCHOLESTEROLEMIA: Primary | ICD-10-CM

## 2024-06-11 DIAGNOSIS — N39.0 FREQUENT UTI: ICD-10-CM

## 2024-06-11 DIAGNOSIS — Z00.00 MEDICARE ANNUAL WELLNESS VISIT, SUBSEQUENT: ICD-10-CM

## 2024-06-11 DIAGNOSIS — H83.09 LABYRINTHITIS, UNSPECIFIED LATERALITY: ICD-10-CM

## 2024-06-11 DIAGNOSIS — D69.6 THROMBOCYTOPENIA, UNSPECIFIED (HCC): ICD-10-CM

## 2024-06-11 LAB
ALBUMIN: 5 G/DL (ref 3.5–5.2)
ALP BLD-CCNC: 86 U/L (ref 35–104)
ALT SERPL-CCNC: 15 U/L (ref 0–32)
ANION GAP SERPL CALCULATED.3IONS-SCNC: 12 MMOL/L (ref 7–16)
AST SERPL-CCNC: 22 U/L (ref 0–31)
BASOPHILS ABSOLUTE: 0.08 K/UL (ref 0–0.2)
BASOPHILS RELATIVE PERCENT: 1 % (ref 0–2)
BILIRUB SERPL-MCNC: 0.3 MG/DL (ref 0–1.2)
BUN BLDV-MCNC: 20 MG/DL (ref 6–23)
CALCIUM SERPL-MCNC: 10.2 MG/DL (ref 8.6–10.2)
CHLORIDE BLD-SCNC: 100 MMOL/L (ref 98–107)
CHOLESTEROL, TOTAL: 245 MG/DL
CO2: 29 MMOL/L (ref 22–29)
CREAT SERPL-MCNC: 1.3 MG/DL (ref 0.5–1)
CREATININE URINE: 62.3 MG/DL (ref 29–226)
EOSINOPHILS ABSOLUTE: 0.22 K/UL (ref 0.05–0.5)
EOSINOPHILS RELATIVE PERCENT: 4 % (ref 0–6)
GFR, ESTIMATED: 43 ML/MIN/1.73M2
GLUCOSE BLD-MCNC: 99 MG/DL (ref 74–99)
HCT VFR BLD CALC: 46.3 % (ref 34–48)
HDLC SERPL-MCNC: 69 MG/DL
HEMOGLOBIN: 15.1 G/DL (ref 11.5–15.5)
IMMATURE GRANULOCYTES %: 1 % (ref 0–5)
IMMATURE GRANULOCYTES ABSOLUTE: 0.04 K/UL (ref 0–0.58)
LDL CHOLESTEROL: 149 MG/DL
LYMPHOCYTES ABSOLUTE: 2.33 K/UL (ref 1.5–4)
LYMPHOCYTES RELATIVE PERCENT: 38 % (ref 20–42)
MCH RBC QN AUTO: 31.5 PG (ref 26–35)
MCHC RBC AUTO-ENTMCNC: 32.6 G/DL (ref 32–34.5)
MCV RBC AUTO: 96.5 FL (ref 80–99.9)
MICROALBUMIN/CREAT 24H UR: 20 MG/L (ref 0–19)
MICROALBUMIN/CREAT UR-RTO: 32 MCG/MG CREAT (ref 0–30)
MONOCYTES ABSOLUTE: 0.4 K/UL (ref 0.1–0.95)
MONOCYTES RELATIVE PERCENT: 7 % (ref 2–12)
NEUTROPHILS RELATIVE PERCENT: 50 % (ref 43–80)
PDW BLD-RTO: 12.3 % (ref 11.5–15)
PHOSPHORUS: 3.6 MG/DL (ref 2.5–4.5)
PLATELET # BLD: 117 K/UL (ref 130–450)
PMV BLD AUTO: 10.2 FL (ref 7–12)
POTASSIUM SERPL-SCNC: 4.8 MMOL/L (ref 3.5–5)
PTH INTACT: 29.5 PG/ML (ref 15–65)
RBC # BLD: 4.8 M/UL (ref 3.5–5.5)
SODIUM BLD-SCNC: 141 MMOL/L (ref 132–146)
TOTAL PROTEIN: 7.7 G/DL (ref 6.4–8.3)
TRIGL SERPL-MCNC: 133 MG/DL
VITAMIN D 25-HYDROXY: 63.1 NG/ML (ref 30–100)
VLDLC SERPL CALC-MCNC: 27 MG/DL
WBC # BLD: 6.1 K/UL (ref 4.5–11.5)

## 2024-06-11 PROCEDURE — G0439 PPPS, SUBSEQ VISIT: HCPCS | Performed by: INTERNAL MEDICINE

## 2024-06-11 PROCEDURE — 1123F ACP DISCUSS/DSCN MKR DOCD: CPT | Performed by: INTERNAL MEDICINE

## 2024-06-11 PROCEDURE — 3017F COLORECTAL CA SCREEN DOC REV: CPT | Performed by: INTERNAL MEDICINE

## 2024-06-11 PROCEDURE — 36415 COLL VENOUS BLD VENIPUNCTURE: CPT | Performed by: INTERNAL MEDICINE

## 2024-06-11 RX ORDER — MECLIZINE HCL 12.5 MG/1
12.5 TABLET ORAL 3 TIMES DAILY PRN
Qty: 90 TABLET | Refills: 0 | Status: SHIPPED | OUTPATIENT
Start: 2024-06-11

## 2024-06-11 RX ORDER — SULFAMETHOXAZOLE AND TRIMETHOPRIM 800; 160 MG/1; MG/1
TABLET ORAL
Qty: 30 TABLET | Refills: 1 | Status: SHIPPED | OUTPATIENT
Start: 2024-06-11

## 2024-06-11 RX ORDER — BENZONATATE 100 MG/1
100-200 CAPSULE ORAL 3 TIMES DAILY PRN
Qty: 21 CAPSULE | Refills: 0 | Status: SHIPPED | OUTPATIENT
Start: 2024-06-11 | End: 2024-06-18

## 2024-06-11 NOTE — PROGRESS NOTES
6/11/24    Mayela Aguilera, a female of 68 y.o. presents today for Medicare AWV      Diagnoses and all orders for this visit:  Hypercholesterolemia  -     Comprehensive Metabolic Panel  -     Lipid Panel  -     CBC with Auto Differential; Future  Thrombocytopenia, unspecified (HCC)  Vitamin D deficiency  -     Vitamin D 25 Hydroxy; Future  Labyrinthitis, unspecified laterality  -     meclizine (ANTIVERT) 12.5 MG tablet; Take 1 tablet by mouth 3 times daily as needed for Dizziness or Nausea take 1 tablet by mouth three times a day  Frequent UTI  -     sulfamethoxazole-trimethoprim (BACTRIM DS;SEPTRA DS) 800-160 MG per tablet; take 1 tablet by mouth AFTER INTERCOURSE TO PREVENT INFECTION  Medicare annual wellness visit, subsequent  Chronic kidney disease, unspecified CKD stage  -     Microalbumin, Ur; Future  -     Phosphorus; Future  -     PTH, Intact; Future  Need for hepatitis C screening test  -     Hepatitis C Antibody; Future  Other orders  -     benzonatate (TESSALON) 100 MG capsule; Take 1-2 capsules by mouth 3 times daily as needed for Cough        Electronically signed by Teddy Jensen DO on 6/11/2024 at 8:24 AM          Medicare Annual Wellness Visit    Mayela Aguilera is here for Medicare AWV    Assessment & Plan   Hypercholesterolemia  -     Comprehensive Metabolic Panel  -     Lipid Panel  -     CBC with Auto Differential; Future  Thrombocytopenia, unspecified (HCC)  Vitamin D deficiency  -     Vitamin D 25 Hydroxy; Future  Labyrinthitis, unspecified laterality  -     meclizine (ANTIVERT) 12.5 MG tablet; Take 1 tablet by mouth 3 times daily as needed for Dizziness or Nausea take 1 tablet by mouth three times a day, Disp-90 tablet, R-0Normal  Frequent UTI  -     sulfamethoxazole-trimethoprim (BACTRIM DS;SEPTRA DS) 800-160 MG per tablet; take 1 tablet by mouth AFTER INTERCOURSE TO PREVENT INFECTION, Disp-30 tablet, R-1Normal  Medicare annual wellness visit, subsequent  Chronic kidney disease,

## 2024-06-12 LAB — HEPATITIS C ANTIBODY: NONREACTIVE

## 2024-06-14 DIAGNOSIS — I10 ESSENTIAL HYPERTENSION: Primary | ICD-10-CM

## 2024-06-14 RX ORDER — ATORVASTATIN CALCIUM 10 MG/1
10 TABLET, FILM COATED ORAL DAILY
Qty: 30 TABLET | Refills: 3 | Status: SHIPPED | OUTPATIENT
Start: 2024-06-14

## 2024-06-14 RX ORDER — LISINOPRIL 5 MG/1
5 TABLET ORAL DAILY
Qty: 30 TABLET | Refills: 0 | Status: SHIPPED | OUTPATIENT
Start: 2024-06-14

## 2024-08-27 ENCOUNTER — TELEPHONE (OUTPATIENT)
Dept: PRIMARY CARE CLINIC | Age: 69
End: 2024-08-27

## 2024-08-27 DIAGNOSIS — N18.9 CHRONIC KIDNEY DISEASE, UNSPECIFIED CKD STAGE: Primary | ICD-10-CM

## 2024-08-27 DIAGNOSIS — R73.01 IFG (IMPAIRED FASTING GLUCOSE): ICD-10-CM

## 2024-08-27 DIAGNOSIS — Z13.220 LIPID SCREENING: ICD-10-CM

## 2024-08-27 DIAGNOSIS — Z13.1 DIABETES MELLITUS SCREENING: ICD-10-CM

## 2024-08-27 DIAGNOSIS — E55.9 VITAMIN D DEFICIENCY: ICD-10-CM

## 2024-08-27 NOTE — TELEPHONE ENCOUNTER
Pt called in stating that at her last appt you wanted her to get her blood work done 3 months from then. She states that would be the beginning of September. She wanted to know if you could put the orders in?    Please advise.

## 2024-09-04 ENCOUNTER — HOSPITAL ENCOUNTER (OUTPATIENT)
Age: 69
Discharge: HOME | End: 2024-09-04
Payer: MEDICARE

## 2024-09-04 DIAGNOSIS — E55.9 VITAMIN D DEFICIENCY: ICD-10-CM

## 2024-09-04 DIAGNOSIS — N18.9 CHRONIC KIDNEY DISEASE, UNSPECIFIED CKD STAGE: ICD-10-CM

## 2024-09-04 DIAGNOSIS — Z13.1 DIABETES MELLITUS SCREENING: ICD-10-CM

## 2024-09-04 DIAGNOSIS — Z13.220 LIPID SCREENING: ICD-10-CM

## 2024-09-04 LAB
25(OH)D3 SERPL-MCNC: 59.9 NG/ML (ref 30–100)
ALBUMIN SERPL-MCNC: 5 G/DL (ref 3.5–5.2)
ALP SERPL-CCNC: 89 U/L (ref 35–104)
ALT SERPL-CCNC: 15 U/L (ref 0–32)
ANION GAP SERPL CALCULATED.3IONS-SCNC: 10 MMOL/L (ref 7–16)
AST SERPL-CCNC: 22 U/L (ref 0–31)
BILIRUB SERPL-MCNC: 0.4 MG/DL (ref 0–1.2)
BUN SERPL-MCNC: 16 MG/DL (ref 6–23)
CALCIUM SERPL-MCNC: 10.4 MG/DL (ref 8.6–10.2)
CHLORIDE SERPL-SCNC: 106 MMOL/L (ref 98–107)
CHOLEST SERPL-MCNC: 167 MG/DL
CO2 SERPL-SCNC: 28 MMOL/L (ref 22–29)
CREAT SERPL-MCNC: 1.2 MG/DL (ref 0.5–1)
CREAT UR-MCNC: 32.4 MG/DL (ref 29–226)
GFR, ESTIMATED: 50 ML/MIN/1.73M2
GLUCOSE SERPL-MCNC: 99 MG/DL (ref 74–99)
HDLC SERPL-MCNC: 61 MG/DL
LDLC SERPL CALC-MCNC: 79 MG/DL
MICROALBUMIN UR-MCNC: <12 MG/L (ref 0–19)
MICROALBUMIN/CREAT UR-RTO: NORMAL MCG/MG CREAT (ref 0–30)
PHOSPHATE SERPL-MCNC: 3.1 MG/DL (ref 2.5–4.5)
POTASSIUM SERPL-SCNC: 4.7 MMOL/L (ref 3.5–5)
PROT SERPL-MCNC: 7.8 G/DL (ref 6.4–8.3)
PTH-INTACT SERPL-MCNC: 28.7 PG/ML (ref 15–65)
SODIUM SERPL-SCNC: 144 MMOL/L (ref 132–146)
TRIGL SERPL-MCNC: 136 MG/DL
VLDLC SERPL CALC-MCNC: 27 MG/DL

## 2024-09-04 PROCEDURE — 80053 COMPREHEN METABOLIC PANEL: CPT

## 2024-09-04 PROCEDURE — 36415 COLL VENOUS BLD VENIPUNCTURE: CPT

## 2024-09-04 PROCEDURE — 82570 ASSAY OF URINE CREATININE: CPT

## 2024-09-04 PROCEDURE — 82043 UR ALBUMIN QUANTITATIVE: CPT

## 2024-09-04 PROCEDURE — 82306 VITAMIN D 25 HYDROXY: CPT

## 2024-09-04 PROCEDURE — 80061 LIPID PANEL: CPT

## 2024-09-04 PROCEDURE — 84100 ASSAY OF PHOSPHORUS: CPT

## 2024-09-04 PROCEDURE — 83970 ASSAY OF PARATHORMONE: CPT

## 2024-10-03 RX ORDER — ATORVASTATIN CALCIUM 10 MG/1
10 TABLET, FILM COATED ORAL DAILY
Qty: 30 TABLET | Refills: 3 | Status: SHIPPED | OUTPATIENT
Start: 2024-10-03

## 2025-02-05 RX ORDER — ATORVASTATIN CALCIUM 10 MG/1
10 TABLET, FILM COATED ORAL DAILY
Qty: 30 TABLET | Refills: 3 | Status: SHIPPED | OUTPATIENT
Start: 2025-02-05

## 2025-06-05 RX ORDER — ATORVASTATIN CALCIUM 10 MG/1
10 TABLET, FILM COATED ORAL DAILY
Qty: 30 TABLET | Refills: 3 | Status: SHIPPED | OUTPATIENT
Start: 2025-06-05

## 2025-06-05 NOTE — TELEPHONE ENCOUNTER
Patients last appointment 6/11/2024.  Patients next scheduled appointment   Future Appointments   Date Time Provider Department Center   7/15/2025  8:15 AM Teddy Jensen, DO [facility]

## 2025-07-08 SDOH — HEALTH STABILITY: PHYSICAL HEALTH: ON AVERAGE, HOW MANY DAYS PER WEEK DO YOU ENGAGE IN MODERATE TO STRENUOUS EXERCISE (LIKE A BRISK WALK)?: 4 DAYS

## 2025-07-08 SDOH — HEALTH STABILITY: PHYSICAL HEALTH: ON AVERAGE, HOW MANY MINUTES DO YOU ENGAGE IN EXERCISE AT THIS LEVEL?: 80 MIN

## 2025-07-08 ASSESSMENT — LIFESTYLE VARIABLES
HOW MANY STANDARD DRINKS CONTAINING ALCOHOL DO YOU HAVE ON A TYPICAL DAY: 1
HOW OFTEN DO YOU HAVE SIX OR MORE DRINKS ON ONE OCCASION: 1
HOW MANY STANDARD DRINKS CONTAINING ALCOHOL DO YOU HAVE ON A TYPICAL DAY: 1 OR 2
HOW OFTEN DO YOU HAVE A DRINK CONTAINING ALCOHOL: 3
HOW OFTEN DO YOU HAVE A DRINK CONTAINING ALCOHOL: 2-4 TIMES A MONTH

## 2025-07-08 ASSESSMENT — PATIENT HEALTH QUESTIONNAIRE - PHQ9
SUM OF ALL RESPONSES TO PHQ QUESTIONS 1-9: 0
SUM OF ALL RESPONSES TO PHQ QUESTIONS 1-9: 0
2. FEELING DOWN, DEPRESSED OR HOPELESS: NOT AT ALL
SUM OF ALL RESPONSES TO PHQ QUESTIONS 1-9: 0
SUM OF ALL RESPONSES TO PHQ QUESTIONS 1-9: 0
1. LITTLE INTEREST OR PLEASURE IN DOING THINGS: NOT AT ALL

## 2025-07-12 SDOH — ECONOMIC STABILITY: TRANSPORTATION INSECURITY
IN THE PAST 12 MONTHS, HAS THE LACK OF TRANSPORTATION KEPT YOU FROM MEDICAL APPOINTMENTS OR FROM GETTING MEDICATIONS?: NO

## 2025-07-12 SDOH — ECONOMIC STABILITY: FOOD INSECURITY: WITHIN THE PAST 12 MONTHS, THE FOOD YOU BOUGHT JUST DIDN'T LAST AND YOU DIDN'T HAVE MONEY TO GET MORE.: NEVER TRUE

## 2025-07-12 SDOH — ECONOMIC STABILITY: INCOME INSECURITY: IN THE LAST 12 MONTHS, WAS THERE A TIME WHEN YOU WERE NOT ABLE TO PAY THE MORTGAGE OR RENT ON TIME?: NO

## 2025-07-12 SDOH — ECONOMIC STABILITY: FOOD INSECURITY: WITHIN THE PAST 12 MONTHS, YOU WORRIED THAT YOUR FOOD WOULD RUN OUT BEFORE YOU GOT MONEY TO BUY MORE.: NEVER TRUE

## 2025-07-15 ENCOUNTER — OFFICE VISIT (OUTPATIENT)
Dept: PRIMARY CARE CLINIC | Age: 70
End: 2025-07-15

## 2025-07-15 VITALS
HEIGHT: 68 IN | BODY MASS INDEX: 18.57 KG/M2 | DIASTOLIC BLOOD PRESSURE: 78 MMHG | WEIGHT: 122.5 LBS | SYSTOLIC BLOOD PRESSURE: 118 MMHG | HEART RATE: 48 BPM | OXYGEN SATURATION: 99 % | TEMPERATURE: 97.7 F

## 2025-07-15 DIAGNOSIS — Z00.00 MEDICARE ANNUAL WELLNESS VISIT, SUBSEQUENT: ICD-10-CM

## 2025-07-15 DIAGNOSIS — Z13.220 LIPID SCREENING: ICD-10-CM

## 2025-07-15 DIAGNOSIS — Z13.1 DIABETES MELLITUS SCREENING: ICD-10-CM

## 2025-07-15 DIAGNOSIS — R73.01 IFG (IMPAIRED FASTING GLUCOSE): ICD-10-CM

## 2025-07-15 DIAGNOSIS — N18.9 CHRONIC KIDNEY DISEASE, UNSPECIFIED CKD STAGE: ICD-10-CM

## 2025-07-15 DIAGNOSIS — E55.9 VITAMIN D DEFICIENCY: ICD-10-CM

## 2025-07-15 DIAGNOSIS — N39.0 FREQUENT UTI: ICD-10-CM

## 2025-07-15 DIAGNOSIS — N18.9 CHRONIC KIDNEY DISEASE, UNSPECIFIED CKD STAGE: Primary | ICD-10-CM

## 2025-07-15 DIAGNOSIS — H83.09 LABYRINTHITIS, UNSPECIFIED LATERALITY: ICD-10-CM

## 2025-07-15 LAB
ALBUMIN: 4.9 G/DL (ref 3.5–5.2)
ALP BLD-CCNC: 81 U/L (ref 35–104)
ALT SERPL-CCNC: 18 U/L (ref 0–35)
ANION GAP SERPL CALCULATED.3IONS-SCNC: 12 MMOL/L (ref 7–16)
AST SERPL-CCNC: 28 U/L (ref 0–35)
BASOPHILS ABSOLUTE: 0.07 K/UL (ref 0–0.2)
BASOPHILS RELATIVE PERCENT: 1 % (ref 0–2)
BILIRUB SERPL-MCNC: 0.4 MG/DL (ref 0–1.2)
BUN BLDV-MCNC: 21 MG/DL (ref 8–23)
CALCIUM SERPL-MCNC: 9.9 MG/DL (ref 8.8–10.2)
CHLORIDE BLD-SCNC: 102 MMOL/L (ref 98–107)
CHOLESTEROL, TOTAL: 178 MG/DL
CO2: 25 MMOL/L (ref 22–29)
CREAT SERPL-MCNC: 1.2 MG/DL (ref 0.5–1)
EOSINOPHILS ABSOLUTE: 0.22 K/UL (ref 0.05–0.5)
EOSINOPHILS RELATIVE PERCENT: 4 % (ref 0–6)
GFR, ESTIMATED: 50 ML/MIN/1.73M2
GLUCOSE BLD-MCNC: 94 MG/DL (ref 74–99)
HCT VFR BLD CALC: 45.1 % (ref 34–48)
HDLC SERPL-MCNC: 73 MG/DL
HEMOGLOBIN: 14.6 G/DL (ref 11.5–15.5)
IMMATURE GRANULOCYTES %: 0 % (ref 0–5)
IMMATURE GRANULOCYTES ABSOLUTE: <0.03 K/UL (ref 0–0.58)
LDL CHOLESTEROL: 81 MG/DL
LYMPHOCYTES ABSOLUTE: 2.29 K/UL (ref 1.5–4)
LYMPHOCYTES RELATIVE PERCENT: 36 % (ref 20–42)
MCH RBC QN AUTO: 30.9 PG (ref 26–35)
MCHC RBC AUTO-ENTMCNC: 32.4 G/DL (ref 32–34.5)
MCV RBC AUTO: 95.3 FL (ref 80–99.9)
MONOCYTES ABSOLUTE: 0.43 K/UL (ref 0.1–0.95)
MONOCYTES RELATIVE PERCENT: 7 % (ref 2–12)
NEUTROPHILS ABSOLUTE: 3.27 K/UL (ref 1.8–7.3)
NEUTROPHILS RELATIVE PERCENT: 52 % (ref 43–80)
PDW BLD-RTO: 12 % (ref 11.5–15)
PHOSPHORUS: 3.5 MG/DL (ref 2.5–4.5)
PLATELET, FLUORESCENCE: 118 K/UL (ref 130–450)
PMV BLD AUTO: 10.1 FL (ref 7–12)
POTASSIUM SERPL-SCNC: 4.2 MMOL/L (ref 3.5–5.1)
RBC # BLD: 4.73 M/UL (ref 3.5–5.5)
SODIUM BLD-SCNC: 139 MMOL/L (ref 136–145)
TOTAL PROTEIN: 7.7 G/DL (ref 6.4–8.3)
TRIGL SERPL-MCNC: 121 MG/DL
VITAMIN D 25-HYDROXY: 60 NG/ML (ref 30–100)
VLDLC SERPL CALC-MCNC: 24 MG/DL
WBC # BLD: 6.3 K/UL (ref 4.5–11.5)

## 2025-07-15 RX ORDER — MECLIZINE HCL 12.5 MG 12.5 MG/1
12.5 TABLET ORAL 3 TIMES DAILY PRN
Qty: 90 TABLET | Refills: 0 | Status: SHIPPED | OUTPATIENT
Start: 2025-07-15

## 2025-07-15 RX ORDER — BENZONATATE 100 MG/1
CAPSULE ORAL 3 TIMES DAILY PRN
COMMUNITY
End: 2025-07-15 | Stop reason: SDUPTHER

## 2025-07-15 RX ORDER — BENZONATATE 100 MG/1
100 CAPSULE ORAL 3 TIMES DAILY PRN
Qty: 30 CAPSULE | Refills: 0 | Status: SHIPPED | OUTPATIENT
Start: 2025-07-15

## 2025-07-15 RX ORDER — SULFAMETHOXAZOLE AND TRIMETHOPRIM 800; 160 MG/1; MG/1
TABLET ORAL
Qty: 30 TABLET | Refills: 1 | Status: SHIPPED | OUTPATIENT
Start: 2025-07-15

## 2025-07-15 NOTE — PROGRESS NOTES
year (on 7/15/2026) for Medicare Annual Wellness Visit in 1 year.     Subjective     History of Present Illness  The patient presents for a routine checkup.    She reports maintaining good health, with her last blood work conducted during her previous visit. She has been under the care of Dr. Brumfield and Dr. Buckner, who have not reported any concerns. She is currently on Lipitor and Detrol, the latter of which she takes as needed, having used it only 2 or 3 times this year. She prefers to have Detrol available in case of bladder issues but does not require daily use. She has made dietary changes, reducing her red meat intake to once or twice a week, with the rest of her diet consisting of fish, chicken, and pork. She has also significantly reduced her salt intake. She engages in cardio exercises at the gym and monitors her heart rate during these sessions. She reports no chest pain, shortness of breath, or leg swelling. Her urination is normal. She has declined the new shingles vaccine due to concerns about side effects, having received the older version years ago. She also declines the pneumonia vaccine. She underwent a bone density test in 2023.    Social History:  Diet: Reduced red meat intake to once or twice a week, primarily consumes fish, chicken, and pork. Significantly reduced salt intake.    Patient's complete Health Risk Assessment and screening values have been reviewed and are found in Flowsheets. The following problems were reviewed today and where indicated follow up appointments were made and/or referrals ordered.    No Positive Risk Factors identified today.                                     Objective   Vitals:    07/15/25 0812   BP: 118/78   Pulse: (!) 48   Temp: 97.7 °F (36.5 °C)   SpO2: 99%   Weight: 55.6 kg (122 lb 8 oz)   Height: 1.727 m (5' 8\")      Body mass index is 18.63 kg/m².      Physical Exam  Respiratory: Clear to auscultation, no wheezing, rales or rhonchi  Cardiovascular: Regular rate